# Patient Record
Sex: MALE | Race: WHITE | NOT HISPANIC OR LATINO
[De-identification: names, ages, dates, MRNs, and addresses within clinical notes are randomized per-mention and may not be internally consistent; named-entity substitution may affect disease eponyms.]

---

## 2018-01-01 ENCOUNTER — RESULT REVIEW (OUTPATIENT)
Age: 73
End: 2018-01-01

## 2018-01-18 ENCOUNTER — RESULT REVIEW (OUTPATIENT)
Age: 73
End: 2018-01-18

## 2021-12-13 ENCOUNTER — TRANSCRIPTION ENCOUNTER (OUTPATIENT)
Age: 76
End: 2021-12-13

## 2022-09-14 ENCOUNTER — APPOINTMENT (OUTPATIENT)
Dept: ORTHOPEDIC SURGERY | Facility: CLINIC | Age: 77
End: 2022-09-14

## 2022-09-14 VITALS — BODY MASS INDEX: 29.84 KG/M2 | WEIGHT: 240 LBS | HEIGHT: 75 IN

## 2022-09-14 DIAGNOSIS — M10.9 GOUT, UNSPECIFIED: ICD-10-CM

## 2022-09-14 PROBLEM — Z00.00 ENCOUNTER FOR PREVENTIVE HEALTH EXAMINATION: Status: ACTIVE | Noted: 2022-09-14

## 2022-09-14 PROCEDURE — 20605 DRAIN/INJ JOINT/BURSA W/O US: CPT

## 2022-09-14 PROCEDURE — 99204 OFFICE O/P NEW MOD 45 MIN: CPT | Mod: 25

## 2022-09-14 PROCEDURE — 73110 X-RAY EXAM OF WRIST: CPT | Mod: RT

## 2022-09-14 NOTE — IMAGING
[de-identified] : right wrist:\par swelling, no ecchymosis\par ttp about wrist\par stiffness due to swelling\par nvid [Right] : right wrist [There are no fractures, subluxations or dislocations. No significant abnormalities are seen] : There are no fractures, subluxations or dislocations. No significant abnormalities are seen

## 2022-09-14 NOTE — ASSESSMENT
[FreeTextEntry1] : The patient was advised of the diagnosis. The natural history of the pathology was explained in full to the patient in layman's terms. All questions were answered. The risks and benefits of surgical and non-surgical treatment alternatives were explained in full to the patient.\par \par Medium joint injection was performed of the left wrist. The indication for this procedure was pain and inflammation. The site was prepped with alcohol and ethyl chloride sprayed topically. An injection of Lidocaine 1cc of 1%  was used Betamethasone (Celestone) 1cc of 6mg.  Patient was advised to call if redness, pain or fever occur and apply ice for 15 minutes out of every hour for the next 12-24 hours as tolerated. The risks benefits, and alternatives have been discussed, and verbal consent was obtained\par

## 2022-09-14 NOTE — HISTORY OF PRESENT ILLNESS
[Intermittent] : intermittent [Nothing helps with pain getting better] : Nothing helps with pain getting better [de-identified] : 9/14/22:  30y h/o gout- stopped takign allopurinol, having flare ups intermittently. 4w ago hit self with hammer and went into  gout attack.  [] : no [FreeTextEntry1] : RT hand and wrist [FreeTextEntry5] : Pt hit his wrist with a hammer. Hx of Gout RT wrist is swollen.

## 2022-12-07 ENCOUNTER — NON-APPOINTMENT (OUTPATIENT)
Age: 77
End: 2022-12-07

## 2023-01-06 ENCOUNTER — APPOINTMENT (OUTPATIENT)
Dept: RADIOLOGY | Facility: CLINIC | Age: 78
End: 2023-01-06
Payer: MEDICARE

## 2023-01-06 PROCEDURE — 71046 X-RAY EXAM CHEST 2 VIEWS: CPT

## 2023-03-20 ENCOUNTER — APPOINTMENT (OUTPATIENT)
Dept: ORTHOPEDIC SURGERY | Facility: CLINIC | Age: 78
End: 2023-03-20
Payer: MEDICARE

## 2023-03-20 VITALS — BODY MASS INDEX: 28.6 KG/M2 | HEIGHT: 75 IN | WEIGHT: 230 LBS

## 2023-03-20 DIAGNOSIS — Z86.79 PERSONAL HISTORY OF OTHER DISEASES OF THE CIRCULATORY SYSTEM: ICD-10-CM

## 2023-03-20 PROCEDURE — 72170 X-RAY EXAM OF PELVIS: CPT

## 2023-03-20 PROCEDURE — 99213 OFFICE O/P EST LOW 20 MIN: CPT

## 2023-03-20 PROCEDURE — 72100 X-RAY EXAM L-S SPINE 2/3 VWS: CPT

## 2023-03-20 NOTE — IMAGING
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

## 2023-03-20 NOTE — PHYSICAL EXAM
[Flexion] : flexion [Extension] : extension [Bending to left] : bending to left [Bending to right] : bending to right [] : mildly antalgic [Facet arthropathy] : Facet arthropathy [Disc space narrowing] : Disc space narrowing [AP] : anteroposterior [Mild arthritis (Tonnis Grade 1)] : Mild arthritis (Tonnis Grade 1) [de-identified] : radicular complaints to the left knee
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
## 2023-05-08 ENCOUNTER — APPOINTMENT (OUTPATIENT)
Dept: ORTHOPEDIC SURGERY | Facility: CLINIC | Age: 78
End: 2023-05-08
Payer: MEDICARE

## 2023-05-08 VITALS — HEIGHT: 75 IN | BODY MASS INDEX: 28.6 KG/M2 | WEIGHT: 230 LBS

## 2023-05-08 DIAGNOSIS — M51.36 OTHER INTERVERTEBRAL DISC DEGENERATION, LUMBAR REGION: ICD-10-CM

## 2023-05-08 DIAGNOSIS — M43.16 SPONDYLOLISTHESIS, LUMBAR REGION: ICD-10-CM

## 2023-05-08 DIAGNOSIS — M54.16 RADICULOPATHY, LUMBAR REGION: ICD-10-CM

## 2023-05-08 PROCEDURE — 99213 OFFICE O/P EST LOW 20 MIN: CPT

## 2023-05-08 NOTE — HISTORY OF PRESENT ILLNESS
[5] : 5 [0] : 0 [Dull/Aching] : dull/aching [Localized] : localized [Retired] : Work status: retired [de-identified] : 3-20-23- 4 week history of lower back pain with radicular complaints left leg to the level of the knee. No injury came on initially as he was climbing up stairs. since then pain with up and down stairs and prolonged standing. \par He can not take nsaids due to pmh of afib and on blood thinner.\par also states he has had elevation of bp in the past with prednisone [] : Post Surgical Visit: no [FreeTextEntry1] : L Leg/hip [FreeTextEntry3] : 2/2023 [FreeTextEntry5] : 77 Y/O RHD M eval L Leg/Hip pt presents with atraumatic pain onset 1 month ago associated with overuse. pt states pain and numbness radiate down the L leg  [de-identified] : None [de-identified] : [place]SAV

## 2023-09-26 ENCOUNTER — APPOINTMENT (OUTPATIENT)
Dept: ORTHOPEDIC SURGERY | Facility: CLINIC | Age: 78
End: 2023-09-26

## 2023-10-18 ENCOUNTER — APPOINTMENT (OUTPATIENT)
Dept: RADIOLOGY | Facility: CLINIC | Age: 78
End: 2023-10-18
Payer: MEDICARE

## 2023-10-18 PROCEDURE — 71046 X-RAY EXAM CHEST 2 VIEWS: CPT

## 2024-02-12 ENCOUNTER — OUTPATIENT (OUTPATIENT)
Dept: OUTPATIENT SERVICES | Facility: HOSPITAL | Age: 79
LOS: 1 days | End: 2024-02-12
Payer: MEDICARE

## 2024-02-12 VITALS
HEART RATE: 68 BPM | HEIGHT: 74 IN | RESPIRATION RATE: 18 BRPM | WEIGHT: 214.95 LBS | TEMPERATURE: 98 F | OXYGEN SATURATION: 98 % | DIASTOLIC BLOOD PRESSURE: 68 MMHG | SYSTOLIC BLOOD PRESSURE: 155 MMHG
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
## 2024-02-12 DIAGNOSIS — Z98.890 OTHER SPECIFIED POSTPROCEDURAL STATES: Chronic | ICD-10-CM

## 2024-02-12 DIAGNOSIS — K40.90 UNILATERAL INGUINAL HERNIA, WITHOUT OBSTRUCTION OR GANGRENE, NOT SPECIFIED AS RECURRENT: ICD-10-CM

## 2024-02-12 DIAGNOSIS — K40.30 UNILATERAL INGUINAL HERNIA, WITH OBSTRUCTION, WITHOUT GANGRENE, NOT SPECIFIED AS RECURRENT: ICD-10-CM

## 2024-02-12 DIAGNOSIS — Z01.818 ENCOUNTER FOR OTHER PREPROCEDURAL EXAMINATION: ICD-10-CM

## 2024-02-12 PROCEDURE — G0463: CPT

## 2024-02-12 NOTE — H&P PST ADULT - NSICDXFAMILYHX_GEN_ALL_CORE_FT
FAMILY HISTORY:  Mother  Still living? No  FH: hypertension, Age at diagnosis: Age Unknown  FH: stroke, Age at diagnosis: Age Unknown

## 2024-02-12 NOTE — H&P PST ADULT - PROBLEM SELECTOR PLAN 1
scheduled for Left Inguinal Hernia repair  Pre-op instructions provided  Patient is on Eliquis for Afib. Advised to get Instructions from PCP

## 2024-02-12 NOTE — H&P PST ADULT - NSICDXPASTMEDICALHX_GEN_ALL_CORE_FT
PAST MEDICAL HISTORY:  Afib     Asthma     Constipation     Difficulty hearing     Enlarged prostate     Eye pressure     Hypercholesteremia     Hypertension     Inguinal hernia     Skin cancer     White coat syndrome with diagnosis of hypertension

## 2024-02-12 NOTE — H&P PST ADULT - HISTORY OF PRESENT ILLNESS
80 y/o male present with left Inguinal Hernia. Patient has history of Afib, on Eliquis  Patient reports that he has discomfort in his left groin site and it hurts with daily activities.  Patient is scheduled for Left Inguinal Hernia Repair on 02/22/2024

## 2024-02-21 ENCOUNTER — TRANSCRIPTION ENCOUNTER (OUTPATIENT)
Age: 79
End: 2024-02-21

## 2024-02-22 ENCOUNTER — TRANSCRIPTION ENCOUNTER (OUTPATIENT)
Age: 79
End: 2024-02-22

## 2024-02-22 ENCOUNTER — OUTPATIENT (OUTPATIENT)
Dept: OUTPATIENT SERVICES | Facility: HOSPITAL | Age: 79
LOS: 1 days | End: 2024-02-22
Payer: MEDICARE

## 2024-02-22 VITALS
TEMPERATURE: 98 F | WEIGHT: 210.54 LBS | DIASTOLIC BLOOD PRESSURE: 70 MMHG | HEART RATE: 84 BPM | HEIGHT: 75 IN | OXYGEN SATURATION: 99 % | RESPIRATION RATE: 15 BRPM | SYSTOLIC BLOOD PRESSURE: 180 MMHG

## 2024-02-22 VITALS
SYSTOLIC BLOOD PRESSURE: 174 MMHG | RESPIRATION RATE: 15 BRPM | OXYGEN SATURATION: 99 % | TEMPERATURE: 98 F | DIASTOLIC BLOOD PRESSURE: 92 MMHG | HEART RATE: 87 BPM
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
## 2024-02-22 DIAGNOSIS — Z98.890 OTHER SPECIFIED POSTPROCEDURAL STATES: Chronic | ICD-10-CM

## 2024-02-22 DIAGNOSIS — K40.30 UNILATERAL INGUINAL HERNIA, WITH OBSTRUCTION, WITHOUT GANGRENE, NOT SPECIFIED AS RECURRENT: ICD-10-CM

## 2024-02-22 PROCEDURE — 49507 PRP I/HERN INIT BLOCK >5 YR: CPT | Mod: LT

## 2024-02-22 PROCEDURE — 88302 TISSUE EXAM BY PATHOLOGIST: CPT

## 2024-02-22 PROCEDURE — 88302 TISSUE EXAM BY PATHOLOGIST: CPT | Mod: 26

## 2024-02-22 PROCEDURE — C1781: CPT

## 2024-02-22 DEVICE — MESH HERNIA PERFIX PLUG LARGE 1.6 X 1.90": Type: IMPLANTABLE DEVICE | Status: FUNCTIONAL

## 2024-02-22 RX ORDER — SODIUM CHLORIDE 9 MG/ML
1000 INJECTION, SOLUTION INTRAVENOUS
Refills: 0 | Status: DISCONTINUED | OUTPATIENT
Start: 2024-02-22 | End: 2024-02-22

## 2024-02-22 RX ORDER — APIXABAN 2.5 MG/1
1 TABLET, FILM COATED ORAL
Refills: 0 | DISCHARGE

## 2024-02-22 RX ORDER — TAMSULOSIN HYDROCHLORIDE 0.4 MG/1
1 CAPSULE ORAL
Refills: 0 | DISCHARGE

## 2024-02-22 RX ORDER — CEFAZOLIN SODIUM 1 G
2000 VIAL (EA) INJECTION ONCE
Refills: 0 | Status: COMPLETED | OUTPATIENT
Start: 2024-02-22 | End: 2024-02-22

## 2024-02-22 RX ORDER — HYDROMORPHONE HYDROCHLORIDE 2 MG/ML
0.2 INJECTION INTRAMUSCULAR; INTRAVENOUS; SUBCUTANEOUS
Refills: 0 | Status: DISCONTINUED | OUTPATIENT
Start: 2024-02-22 | End: 2024-02-22

## 2024-02-22 RX ORDER — PROPRANOLOL HCL 160 MG
1 CAPSULE, EXTENDED RELEASE 24HR ORAL
Refills: 0 | DISCHARGE

## 2024-02-22 RX ORDER — LATANOPROST 0.05 MG/ML
1 SOLUTION/ DROPS OPHTHALMIC; TOPICAL
Refills: 0 | DISCHARGE

## 2024-02-22 RX ORDER — ONDANSETRON 8 MG/1
4 TABLET, FILM COATED ORAL ONCE
Refills: 0 | Status: DISCONTINUED | OUTPATIENT
Start: 2024-02-22 | End: 2024-02-22

## 2024-02-22 RX ORDER — CHLORHEXIDINE GLUCONATE 213 G/1000ML
1 SOLUTION TOPICAL ONCE
Refills: 0 | Status: COMPLETED | OUTPATIENT
Start: 2024-02-22 | End: 2024-02-22

## 2024-02-22 RX ORDER — HYDROMORPHONE HYDROCHLORIDE 2 MG/ML
0.5 INJECTION INTRAMUSCULAR; INTRAVENOUS; SUBCUTANEOUS
Refills: 0 | Status: DISCONTINUED | OUTPATIENT
Start: 2024-02-22 | End: 2024-02-22

## 2024-02-22 RX ORDER — CAPTOPRIL 12.5 MG/1
1 TABLET ORAL
Refills: 0 | DISCHARGE

## 2024-02-22 RX ORDER — ATORVASTATIN CALCIUM 80 MG/1
1 TABLET, FILM COATED ORAL
Refills: 0 | DISCHARGE

## 2024-02-22 RX ORDER — OXYCODONE AND ACETAMINOPHEN 5; 325 MG/1; MG/1
1 TABLET ORAL ONCE
Refills: 0 | Status: DISCONTINUED | OUTPATIENT
Start: 2024-02-22 | End: 2024-02-22

## 2024-02-22 RX ORDER — BUDESONIDE, MICRONIZED 100 %
1 POWDER (GRAM) MISCELLANEOUS
Refills: 0 | DISCHARGE

## 2024-02-22 RX ORDER — OXYCODONE HYDROCHLORIDE 5 MG/1
1 TABLET ORAL
Qty: 15 | Refills: 0
Start: 2024-02-22

## 2024-02-22 RX ADMIN — SODIUM CHLORIDE 75 MILLILITER(S): 9 INJECTION, SOLUTION INTRAVENOUS at 11:34

## 2024-02-22 RX ADMIN — CHLORHEXIDINE GLUCONATE 1 APPLICATION(S): 213 SOLUTION TOPICAL at 08:37

## 2024-02-22 NOTE — ASU DISCHARGE PLAN (ADULT/PEDIATRIC) - ASU DC SPECIAL INSTRUCTIONSFT
Follow all verbal and written instructions. Take medications as prescribed. DO NOT drive, operate machinery, and/or make important decisions while on prescription pain medication. DO NOT hesitate to call Doctor's office with questions or concerns. Certain prescription pain medication can cause constipation; take a stool softener such as Colace 100mg 3 x a day, to avoid the constipating effects of prescription pain medication.    - Remove outer dressing tomorrow before showering, leave steri strips underneath in place  - Ice for swelling  - No lifting >20 lbs for 6 weeks  - Please call the office at [phone] to schedule a post-op appointment for 7-10 days  May start you   tomorrow Follow all verbal and written instructions. Take medications as prescribed. DO NOT drive, operate machinery, and/or make important decisions while on prescription pain medication. DO NOT hesitate to call Doctor's office with questions or concerns. Certain prescription pain medication can cause constipation; take a stool softener such as Colace 100mg 3 x a day, to avoid the constipating effects of prescription pain medication.    RESTART your ELIQUIS TOMORROW 2/23    - Remove outer dressing tomorrow before showering, leave steri strips underneath in place  - Ice for swelling  - No lifting >20 lbs for 6 weeks  - Please call the office at [phone] to schedule a post-op appointment for 7-10 days  May start you   tomorrow Follow all verbal and written instructions. Take medications as prescribed. DO NOT drive, operate machinery, and/or make important decisions while on prescription pain medication. DO NOT hesitate to call Doctor's office with questions or concerns. Certain prescription pain medication can cause constipation; take a stool softener such as Colace 100mg 3 x a day, to avoid the constipating effects of prescription pain medication.    RESTART your ELIQUIS TOMORROW 2/23    For SEVERE pain:  Oxycodone for severe pain as prescribed.   prescription from your pharmacy    For MODERATE pain:  Tylenol 325mg 2 tabs (650mg total) every 6 hours.  Ibuprofen 200mg 2 tabs (400mg total) every 6 hours  Alternate taking medication every 3 hours - Take Tylenol 650 mg first, then 3 hours later take Ibuprofen 400mg.  Then 3 hours after the Ibuprofen, take Tylenol, and so on.  The Tylenol dose is 6 hours apart from the next Tylenol dose..... The Ibuprofen is 6 hours apart from the next Ibuprofen dose, but the Tylenol and the Ibuprofen are taken three hours apart from each other.    - Leave steri strips in place. OK to shower with them  - Ice for swelling  - No lifting >20 lbs for 6 weeks  - Please call the office at [phone] to schedule a post-op appointment for 7-10 days    Start your ELIQUIS tomorrow 2/23 Follow all verbal and written instructions. Take medications as prescribed. DO NOT drive, operate machinery, and/or make important decisions while on prescription pain medication. DO NOT hesitate to call Doctor's office with questions or concerns. Certain prescription pain medication can cause constipation; take a stool softener such as Colace 100mg 3 x a day, to avoid the constipating effects of prescription pain medication.    RESTART your ELIQUIS TOMORROW 2/23    For SEVERE pain:  Oxycodone for severe pain as prescribed.   prescription from your pharmacy    For MODERATE pain:  Tylenol 325mg 2 tabs (650mg total) every 6 hours.     - Leave steri strips in place. OK to shower with them  - Ice for swelling  - No lifting >20 lbs for 6 weeks  - Please call the office at [phone] to schedule a post-op appointment for 7-10 days    Start your ELIQUIS tomorrow 2/23

## 2024-02-22 NOTE — BRIEF OPERATIVE NOTE - NSICDXBRIEFPROCEDURE_GEN_ALL_CORE_FT
PROCEDURES:  Open repair of inguinal hernia using mesh in adult 22-Feb-2024 10:57:00 LEFT side Shira Arnold

## 2024-02-22 NOTE — ASU DISCHARGE PLAN (ADULT/PEDIATRIC) - FOLLOW UP APPOINTMENTS
[facility] Surgical care or [facility], Urgent Care, Emergency Room, or call 911 [facility] Surgical care or [facility], Urgent Care, Emergency Room, or call [phone]

## 2024-02-22 NOTE — ASU DISCHARGE PLAN (ADULT/PEDIATRIC) - PATIENT EDUCATION MATERIALS PROVIED
[facility] Surgical Care or [facility], Urgent Care, Emergency Room, or call 911

## 2024-02-22 NOTE — ASU DISCHARGE PLAN (ADULT/PEDIATRIC) - CARE PROVIDER_API CALL
Estuardo Bain  Surgery  [address], [address]  [place] [address]  Phone: [phone]  Fax: [phone]  Follow Up Time:

## 2024-02-22 NOTE — ASU DISCHARGE PLAN (ADULT/PEDIATRIC) - NS MD DC FALL RISK RISK
For information on Fall & Injury Prevention, visit: [email or web address] OR  [email or web address] OR  https://www.cdc.gov/steadi/patient.html

## 2024-02-29 ENCOUNTER — INPATIENT (INPATIENT)
Facility: HOSPITAL | Age: 79
LOS: 9 days | Discharge: HOME | End: 2024-03-10
Attending: SURGERY | Admitting: SURGERY
Payer: MEDICARE

## 2024-02-29 VITALS
TEMPERATURE: 98 F | HEIGHT: 75 IN | OXYGEN SATURATION: 100 % | HEART RATE: 71 BPM | DIASTOLIC BLOOD PRESSURE: 60 MMHG | RESPIRATION RATE: 18 BRPM | SYSTOLIC BLOOD PRESSURE: 127 MMHG

## 2024-02-29 DIAGNOSIS — Z98.890 OTHER SPECIFIED POSTPROCEDURAL STATES: Chronic | ICD-10-CM
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
## 2024-02-29 PROCEDURE — 99285 EMERGENCY DEPT VISIT HI MDM: CPT

## 2024-02-29 NOTE — ED ADULT NURSE NOTE - CHIEF COMPLAINT QUOTE
pt to ED for generalized weakness x2 day, pt is 1 week post op of inguinal hernia repair. c/o of ecchymosis to surgical site and testicular swelling. takes eliquis daily. denies chest pain, sob, dizziness. pt presents jaundice, pt baseline. arrives with 20G IV in L had, received 150ml NS en route. phx: afib, HTN, asthma, skin cancer, gilbert's disease

## 2024-02-29 NOTE — ED ADULT NURSE NOTE - NSFALLRISKINTERV_ED_ALL_ED

## 2024-02-29 NOTE — ED ADULT NURSE NOTE - OBJECTIVE STATEMENT
Pt received to Rm 9, A&Ox4. Pt endorsing generalized weakness x 2 days. Pt s/p inguinal hernia repair x 1 week, endorsing swelling/ecchymosis to surgical site as well as testicular swelling. Pt hx of afib on eliquis, HTN, skin ca, gilbert's disease. Pt denies headache, chest pain, SOB, dizziness, N/V/D, chills. Pt noted to be jaundiced, sclera jaundiced- pt baseline. Redness, swelling, eccymosis noted to surgical site/testicular region. Skin otherwise clean, dry, intact. Pt arrives with 20G Iv to left hand placed by EMS prior to arrival. Respirations even and unlabored. Safety maintained, Bed locked in lowest position, call bell within reach. Attending provider to evaluate pt, pending further orders.

## 2024-02-29 NOTE — ED ADULT TRIAGE NOTE - CHIEF COMPLAINT QUOTE
pt to ED for generalized weakness x2 day, pt is 1 week post op of inguinal hernia repair. c/o of ecchymosis to surgical site and testicular swelling. takes eliquis daily. denies chest pain, sob, dizziness. pt presents jaundice, pt baseline. phx: afib, HTN, asthma, skin cancer, gilbert's disease pt to ED for generalized weakness x2 day, pt is 1 week post op of inguinal hernia repair. c/o of ecchymosis to surgical site and testicular swelling. takes eliquis daily. denies chest pain, sob, dizziness. pt presents jaundice, pt baseline. arrives with 20G IV in L had, received 150ml NS en route. phx: afib, HTN, asthma, skin cancer, gilbert's disease

## 2024-03-01 DIAGNOSIS — E87.1 HYPO-OSMOLALITY AND HYPONATREMIA: ICD-10-CM

## 2024-03-01 PROBLEM — I10 ESSENTIAL (PRIMARY) HYPERTENSION: Chronic | Status: ACTIVE | Noted: 2024-02-12

## 2024-03-01 PROBLEM — K59.00 CONSTIPATION, UNSPECIFIED: Chronic | Status: ACTIVE | Noted: 2024-02-12

## 2024-03-01 PROBLEM — K40.90 UNILATERAL INGUINAL HERNIA, WITHOUT OBSTRUCTION OR GANGRENE, NOT SPECIFIED AS RECURRENT: Chronic | Status: ACTIVE | Noted: 2024-02-12

## 2024-03-01 PROBLEM — J45.909 UNSPECIFIED ASTHMA, UNCOMPLICATED: Chronic | Status: ACTIVE | Noted: 2024-02-12

## 2024-03-01 PROBLEM — N40.0 BENIGN PROSTATIC HYPERPLASIA WITHOUT LOWER URINARY TRACT SYMPTOMS: Chronic | Status: ACTIVE | Noted: 2024-02-12

## 2024-03-01 PROBLEM — C44.90 UNSPECIFIED MALIGNANT NEOPLASM OF SKIN, UNSPECIFIED: Chronic | Status: ACTIVE | Noted: 2024-02-12

## 2024-03-01 PROBLEM — H57.9 UNSPECIFIED DISORDER OF EYE AND ADNEXA: Chronic | Status: ACTIVE | Noted: 2024-02-12

## 2024-03-01 PROBLEM — E78.00 PURE HYPERCHOLESTEROLEMIA, UNSPECIFIED: Chronic | Status: ACTIVE | Noted: 2024-02-12

## 2024-03-01 PROBLEM — H91.90 UNSPECIFIED HEARING LOSS, UNSPECIFIED EAR: Chronic | Status: ACTIVE | Noted: 2024-02-12

## 2024-03-01 PROBLEM — I48.91 UNSPECIFIED ATRIAL FIBRILLATION: Chronic | Status: ACTIVE | Noted: 2024-02-12

## 2024-03-01 LAB
ADD ON TEST-SPECIMEN IN LAB: SIGNIFICANT CHANGE UP
ALBUMIN SERPL ELPH-MCNC: 3.2 G/DL — LOW (ref 3.3–5)
ALBUMIN SERPL ELPH-MCNC: 3.3 G/DL — SIGNIFICANT CHANGE UP (ref 3.3–5)
ALP SERPL-CCNC: 93 U/L — SIGNIFICANT CHANGE UP (ref 40–120)
ALP SERPL-CCNC: 99 U/L — SIGNIFICANT CHANGE UP (ref 40–120)
ALT FLD-CCNC: 17 U/L — SIGNIFICANT CHANGE UP (ref 4–41)
ALT FLD-CCNC: 22 U/L — SIGNIFICANT CHANGE UP (ref 4–41)
ANION GAP SERPL CALC-SCNC: 10 MMOL/L — SIGNIFICANT CHANGE UP (ref 7–14)
ANION GAP SERPL CALC-SCNC: 10 MMOL/L — SIGNIFICANT CHANGE UP (ref 7–14)
ANION GAP SERPL CALC-SCNC: 11 MMOL/L — SIGNIFICANT CHANGE UP (ref 7–14)
ANION GAP SERPL CALC-SCNC: 12 MMOL/L — SIGNIFICANT CHANGE UP (ref 7–14)
ANION GAP SERPL CALC-SCNC: 8 MMOL/L — SIGNIFICANT CHANGE UP (ref 7–14)
ANISOCYTOSIS BLD QL: SLIGHT — SIGNIFICANT CHANGE UP
APPEARANCE UR: ABNORMAL
APTT BLD: 34.4 SEC — SIGNIFICANT CHANGE UP (ref 24.5–35.6)
AST SERPL-CCNC: 31 U/L — SIGNIFICANT CHANGE UP (ref 4–40)
AST SERPL-CCNC: 33 U/L — SIGNIFICANT CHANGE UP (ref 4–40)
BACTERIA # UR AUTO: NEGATIVE /HPF — SIGNIFICANT CHANGE UP
BASE EXCESS BLDV CALC-SCNC: 0.5 MMOL/L — SIGNIFICANT CHANGE UP (ref -2–3)
BASOPHILS # BLD AUTO: 0 K/UL — SIGNIFICANT CHANGE UP (ref 0–0.2)
BASOPHILS NFR BLD AUTO: 0 % — SIGNIFICANT CHANGE UP (ref 0–2)
BILIRUB DIRECT SERPL-MCNC: 1.2 MG/DL — HIGH (ref 0–0.3)
BILIRUB INDIRECT FLD-MCNC: 7.4 MG/DL — HIGH (ref 0–1)
BILIRUB SERPL-MCNC: 8.2 MG/DL — HIGH (ref 0.2–1.2)
BILIRUB SERPL-MCNC: 8.6 MG/DL — HIGH (ref 0.2–1.2)
BILIRUB UR-MCNC: ABNORMAL
BLD GP AB SCN SERPL QL: NEGATIVE — SIGNIFICANT CHANGE UP
BLOOD GAS VENOUS COMPREHENSIVE RESULT: SIGNIFICANT CHANGE UP
BUN SERPL-MCNC: 13 MG/DL — SIGNIFICANT CHANGE UP (ref 7–23)
BUN SERPL-MCNC: 14 MG/DL — SIGNIFICANT CHANGE UP (ref 7–23)
BUN SERPL-MCNC: 15 MG/DL — SIGNIFICANT CHANGE UP (ref 7–23)
BUN SERPL-MCNC: 15 MG/DL — SIGNIFICANT CHANGE UP (ref 7–23)
BUN SERPL-MCNC: 16 MG/DL — SIGNIFICANT CHANGE UP (ref 7–23)
BURR CELLS BLD QL SMEAR: PRESENT — SIGNIFICANT CHANGE UP
CALCIUM SERPL-MCNC: 7.6 MG/DL — LOW (ref 8.4–10.5)
CALCIUM SERPL-MCNC: 7.9 MG/DL — LOW (ref 8.4–10.5)
CALCIUM SERPL-MCNC: 7.9 MG/DL — LOW (ref 8.4–10.5)
CALCIUM SERPL-MCNC: 8 MG/DL — LOW (ref 8.4–10.5)
CALCIUM SERPL-MCNC: 8 MG/DL — LOW (ref 8.4–10.5)
CAST: 0 /LPF — SIGNIFICANT CHANGE UP (ref 0–4)
CHLORIDE BLDV-SCNC: 76 MMOL/L — LOW (ref 96–108)
CHLORIDE SERPL-SCNC: 73 MMOL/L — LOW (ref 98–107)
CHLORIDE SERPL-SCNC: 75 MMOL/L — LOW (ref 98–107)
CHLORIDE SERPL-SCNC: 75 MMOL/L — LOW (ref 98–107)
CHLORIDE SERPL-SCNC: 78 MMOL/L — LOW (ref 98–107)
CHLORIDE SERPL-SCNC: 80 MMOL/L — LOW (ref 98–107)
CO2 BLDV-SCNC: 27.4 MMOL/L — HIGH (ref 22–26)
CO2 SERPL-SCNC: 21 MMOL/L — LOW (ref 22–31)
CO2 SERPL-SCNC: 21 MMOL/L — LOW (ref 22–31)
CO2 SERPL-SCNC: 23 MMOL/L — SIGNIFICANT CHANGE UP (ref 22–31)
COLOR SPEC: ABNORMAL
CREAT ?TM UR-MCNC: 99 MG/DL — SIGNIFICANT CHANGE UP
CREAT SERPL-MCNC: 0.58 MG/DL — SIGNIFICANT CHANGE UP (ref 0.5–1.3)
CREAT SERPL-MCNC: 0.59 MG/DL — SIGNIFICANT CHANGE UP (ref 0.5–1.3)
CREAT SERPL-MCNC: 0.61 MG/DL — SIGNIFICANT CHANGE UP (ref 0.5–1.3)
CREAT SERPL-MCNC: 0.61 MG/DL — SIGNIFICANT CHANGE UP (ref 0.5–1.3)
CREAT SERPL-MCNC: 0.7 MG/DL — SIGNIFICANT CHANGE UP (ref 0.5–1.3)
DIFF PNL FLD: NEGATIVE — SIGNIFICANT CHANGE UP
EGFR: 94 ML/MIN/1.73M2 — SIGNIFICANT CHANGE UP
EGFR: 98 ML/MIN/1.73M2 — SIGNIFICANT CHANGE UP
EGFR: 98 ML/MIN/1.73M2 — SIGNIFICANT CHANGE UP
EGFR: 99 ML/MIN/1.73M2 — SIGNIFICANT CHANGE UP
EGFR: 99 ML/MIN/1.73M2 — SIGNIFICANT CHANGE UP
EOSINOPHIL # BLD AUTO: 0 K/UL — SIGNIFICANT CHANGE UP (ref 0–0.5)
EOSINOPHIL NFR BLD AUTO: 0 % — SIGNIFICANT CHANGE UP (ref 0–6)
GAS PNL BLDV: 105 MMOL/L — CRITICAL LOW (ref 136–145)
GLUCOSE BLDV-MCNC: 88 MG/DL — SIGNIFICANT CHANGE UP (ref 70–99)
GLUCOSE SERPL-MCNC: 130 MG/DL — HIGH (ref 70–99)
GLUCOSE SERPL-MCNC: 81 MG/DL — SIGNIFICANT CHANGE UP (ref 70–99)
GLUCOSE SERPL-MCNC: 85 MG/DL — SIGNIFICANT CHANGE UP (ref 70–99)
GLUCOSE SERPL-MCNC: 88 MG/DL — SIGNIFICANT CHANGE UP (ref 70–99)
GLUCOSE SERPL-MCNC: 94 MG/DL — SIGNIFICANT CHANGE UP (ref 70–99)
GLUCOSE UR QL: NEGATIVE MG/DL — SIGNIFICANT CHANGE UP
HCO3 BLDV-SCNC: 26 MMOL/L — SIGNIFICANT CHANGE UP (ref 22–29)
HCT VFR BLD CALC: 24 % — LOW (ref 39–50)
HCT VFR BLD CALC: 29 % — LOW (ref 39–50)
HCT VFR BLDA CALC: 25 % — LOW (ref 39–51)
HGB BLD CALC-MCNC: 8.4 G/DL — LOW (ref 12.6–17.4)
HGB BLD-MCNC: 8.9 G/DL — LOW (ref 13–17)
HGB BLD-MCNC: 9.7 G/DL — LOW (ref 13–17)
IANC: 10.74 K/UL — HIGH (ref 1.8–7.4)
INR BLD: 2.06 RATIO — HIGH (ref 0.85–1.18)
KETONES UR-MCNC: 15 MG/DL
LACTATE BLDV-MCNC: 1.2 MMOL/L — SIGNIFICANT CHANGE UP (ref 0.5–2)
LEUKOCYTE ESTERASE UR-ACNC: ABNORMAL
LYMPHOCYTES # BLD AUTO: 1.26 K/UL — SIGNIFICANT CHANGE UP (ref 1–3.3)
LYMPHOCYTES # BLD AUTO: 8.7 % — LOW (ref 13–44)
MAGNESIUM SERPL-MCNC: 1.8 MG/DL — SIGNIFICANT CHANGE UP (ref 1.6–2.6)
MAGNESIUM SERPL-MCNC: 1.9 MG/DL — SIGNIFICANT CHANGE UP (ref 1.6–2.6)
MAGNESIUM SERPL-MCNC: 2.2 MG/DL — SIGNIFICANT CHANGE UP (ref 1.6–2.6)
MANUAL SMEAR VERIFICATION: SIGNIFICANT CHANGE UP
MCHC RBC-ENTMCNC: 30.9 PG — SIGNIFICANT CHANGE UP (ref 27–34)
MCHC RBC-ENTMCNC: 33.1 PG — SIGNIFICANT CHANGE UP (ref 27–34)
MCHC RBC-ENTMCNC: 33.4 GM/DL — SIGNIFICANT CHANGE UP (ref 32–36)
MCHC RBC-ENTMCNC: 37.1 GM/DL — HIGH (ref 32–36)
MCV RBC AUTO: 83.3 FL — SIGNIFICANT CHANGE UP (ref 80–100)
MCV RBC AUTO: 98.6 FL — SIGNIFICANT CHANGE UP (ref 80–100)
MICROCYTES BLD QL: SLIGHT — SIGNIFICANT CHANGE UP
MONOCYTES # BLD AUTO: 1.13 K/UL — HIGH (ref 0–0.9)
MONOCYTES NFR BLD AUTO: 7.8 % — SIGNIFICANT CHANGE UP (ref 2–14)
NEUTROPHILS # BLD AUTO: 12.07 K/UL — HIGH (ref 1.8–7.4)
NEUTROPHILS NFR BLD AUTO: 83.5 % — HIGH (ref 43–77)
NITRITE UR-MCNC: NEGATIVE — SIGNIFICANT CHANGE UP
NRBC # BLD: 0 /100 WBCS — SIGNIFICANT CHANGE UP (ref 0–0)
NRBC # FLD: 0 K/UL — SIGNIFICANT CHANGE UP (ref 0–0)
OSMOLALITY SERPL: 233 MOSM/KG — LOW (ref 275–295)
OSMOLALITY UR: 596 MOSM/KG — SIGNIFICANT CHANGE UP (ref 50–1200)
PCO2 BLDV: 45 MMHG — SIGNIFICANT CHANGE UP (ref 42–55)
PH BLDV: 7.37 — SIGNIFICANT CHANGE UP (ref 7.32–7.43)
PH UR: 8 — SIGNIFICANT CHANGE UP (ref 5–8)
PHOSPHATE SERPL-MCNC: 1.8 MG/DL — LOW (ref 2.5–4.5)
PHOSPHATE SERPL-MCNC: 1.9 MG/DL — LOW (ref 2.5–4.5)
PHOSPHATE SERPL-MCNC: 2.6 MG/DL — SIGNIFICANT CHANGE UP (ref 2.5–4.5)
PLAT MORPH BLD: NORMAL — SIGNIFICANT CHANGE UP
PLATELET # BLD AUTO: 290 K/UL — SIGNIFICANT CHANGE UP (ref 150–400)
PLATELET # BLD AUTO: 300 K/UL — SIGNIFICANT CHANGE UP (ref 150–400)
PLATELET COUNT - ESTIMATE: NORMAL — SIGNIFICANT CHANGE UP
PO2 BLDV: < 20 MMHG — SIGNIFICANT CHANGE UP (ref 25–45)
POIKILOCYTOSIS BLD QL AUTO: SLIGHT — SIGNIFICANT CHANGE UP
POLYCHROMASIA BLD QL SMEAR: SLIGHT — SIGNIFICANT CHANGE UP
POTASSIUM BLDV-SCNC: 4.4 MMOL/L — SIGNIFICANT CHANGE UP (ref 3.5–5.1)
POTASSIUM SERPL-MCNC: 4.3 MMOL/L — SIGNIFICANT CHANGE UP (ref 3.5–5.3)
POTASSIUM SERPL-MCNC: 4.5 MMOL/L — SIGNIFICANT CHANGE UP (ref 3.5–5.3)
POTASSIUM SERPL-MCNC: 4.8 MMOL/L — SIGNIFICANT CHANGE UP (ref 3.5–5.3)
POTASSIUM SERPL-MCNC: 4.9 MMOL/L — SIGNIFICANT CHANGE UP (ref 3.5–5.3)
POTASSIUM SERPL-MCNC: 5.3 MMOL/L — SIGNIFICANT CHANGE UP (ref 3.5–5.3)
POTASSIUM SERPL-SCNC: 4.3 MMOL/L — SIGNIFICANT CHANGE UP (ref 3.5–5.3)
POTASSIUM SERPL-SCNC: 4.5 MMOL/L — SIGNIFICANT CHANGE UP (ref 3.5–5.3)
POTASSIUM SERPL-SCNC: 4.8 MMOL/L — SIGNIFICANT CHANGE UP (ref 3.5–5.3)
POTASSIUM SERPL-SCNC: 4.9 MMOL/L — SIGNIFICANT CHANGE UP (ref 3.5–5.3)
POTASSIUM SERPL-SCNC: 5.3 MMOL/L — SIGNIFICANT CHANGE UP (ref 3.5–5.3)
POTASSIUM UR-SCNC: 66.6 MMOL/L — SIGNIFICANT CHANGE UP
PROT ?TM UR-MCNC: 25 MG/DL — SIGNIFICANT CHANGE UP
PROT SERPL-MCNC: 5.7 G/DL — LOW (ref 6–8.3)
PROT SERPL-MCNC: 5.9 G/DL — LOW (ref 6–8.3)
PROT UR-MCNC: 30 MG/DL
PROT/CREAT UR-RTO: 0.2 RATIO — SIGNIFICANT CHANGE UP (ref 0–0.2)
PROTHROM AB SERPL-ACNC: 22.8 SEC — HIGH (ref 9.5–13)
RBC # BLD: 2.88 M/UL — LOW (ref 4.2–5.8)
RBC # BLD: 2.94 M/UL — LOW (ref 4.2–5.8)
RBC # FLD: 13.2 % — SIGNIFICANT CHANGE UP (ref 10.3–14.5)
RBC # FLD: 13.7 % — SIGNIFICANT CHANGE UP (ref 10.3–14.5)
RBC BLD AUTO: ABNORMAL
RBC CASTS # UR COMP ASSIST: 2 /HPF — SIGNIFICANT CHANGE UP (ref 0–4)
RH IG SCN BLD-IMP: POSITIVE — SIGNIFICANT CHANGE UP
SAO2 % BLDV: 34.5 % — LOW (ref 67–88)
SODIUM SERPL-SCNC: 106 MMOL/L — CRITICAL LOW (ref 135–145)
SODIUM SERPL-SCNC: 107 MMOL/L — CRITICAL LOW (ref 135–145)
SODIUM SERPL-SCNC: 108 MMOL/L — CRITICAL LOW (ref 135–145)
SODIUM SERPL-SCNC: 111 MMOL/L — CRITICAL LOW (ref 135–145)
SODIUM SERPL-SCNC: 111 MMOL/L — CRITICAL LOW (ref 135–145)
SODIUM UR-SCNC: 41 MMOL/L — SIGNIFICANT CHANGE UP
SP GR SPEC: 1.02 — SIGNIFICANT CHANGE UP (ref 1–1.03)
SQUAMOUS # UR AUTO: 0 /HPF — SIGNIFICANT CHANGE UP (ref 0–5)
UROBILINOGEN FLD QL: 1 MG/DL — SIGNIFICANT CHANGE UP (ref 0.2–1)
UUN UR-MCNC: 990 MG/DL — SIGNIFICANT CHANGE UP
WBC # BLD: 14.46 K/UL — HIGH (ref 3.8–10.5)
WBC # BLD: 14.88 K/UL — HIGH (ref 3.8–10.5)
WBC # FLD AUTO: 14.46 K/UL — HIGH (ref 3.8–10.5)
WBC # FLD AUTO: 14.88 K/UL — HIGH (ref 3.8–10.5)
WBC UR QL: 1 /HPF — SIGNIFICANT CHANGE UP (ref 0–5)

## 2024-03-01 PROCEDURE — 99222 1ST HOSP IP/OBS MODERATE 55: CPT | Mod: 24,GC

## 2024-03-01 PROCEDURE — 74174 CTA ABD&PLVS W/CONTRAST: CPT | Mod: 26,MC

## 2024-03-01 PROCEDURE — 70450 CT HEAD/BRAIN W/O DYE: CPT | Mod: 26,MC

## 2024-03-01 RX ORDER — CHLORHEXIDINE GLUCONATE 213 G/1000ML
1 SOLUTION TOPICAL DAILY
Refills: 0 | Status: DISCONTINUED | OUTPATIENT
Start: 2024-03-01 | End: 2024-03-10

## 2024-03-01 RX ORDER — ATORVASTATIN CALCIUM 80 MG/1
20 TABLET, FILM COATED ORAL AT BEDTIME
Refills: 0 | Status: DISCONTINUED | OUTPATIENT
Start: 2024-03-01 | End: 2024-03-10

## 2024-03-01 RX ORDER — MAGNESIUM SULFATE 500 MG/ML
2 VIAL (ML) INJECTION ONCE
Refills: 0 | Status: COMPLETED | OUTPATIENT
Start: 2024-03-01 | End: 2024-03-01

## 2024-03-01 RX ORDER — POTASSIUM PHOSPHATE, MONOBASIC POTASSIUM PHOSPHATE, DIBASIC 236; 224 MG/ML; MG/ML
15 INJECTION, SOLUTION INTRAVENOUS ONCE
Refills: 0 | Status: COMPLETED | OUTPATIENT
Start: 2024-03-01 | End: 2024-03-01

## 2024-03-01 RX ORDER — MOMETASONE FUROATE 220 UG/1
1 INHALANT RESPIRATORY (INHALATION) DAILY
Refills: 0 | Status: DISCONTINUED | OUTPATIENT
Start: 2024-03-01 | End: 2024-03-01

## 2024-03-01 RX ORDER — SODIUM CHLORIDE 5 G/100ML
500 INJECTION, SOLUTION INTRAVENOUS
Refills: 0 | Status: DISCONTINUED | OUTPATIENT
Start: 2024-03-01 | End: 2024-03-03

## 2024-03-01 RX ORDER — CAPTOPRIL 12.5 MG/1
25 TABLET ORAL EVERY 8 HOURS
Refills: 0 | Status: DISCONTINUED | OUTPATIENT
Start: 2024-03-01 | End: 2024-03-03

## 2024-03-01 RX ORDER — SODIUM CHLORIDE 9 MG/ML
1000 INJECTION INTRAMUSCULAR; INTRAVENOUS; SUBCUTANEOUS
Refills: 0 | Status: DISCONTINUED | OUTPATIENT
Start: 2024-03-01 | End: 2024-03-01

## 2024-03-01 RX ORDER — METOPROLOL TARTRATE 50 MG
5 TABLET ORAL EVERY 6 HOURS
Refills: 0 | Status: DISCONTINUED | OUTPATIENT
Start: 2024-03-01 | End: 2024-03-01

## 2024-03-01 RX ORDER — TAMSULOSIN HYDROCHLORIDE 0.4 MG/1
0.4 CAPSULE ORAL AT BEDTIME
Refills: 0 | Status: DISCONTINUED | OUTPATIENT
Start: 2024-03-01 | End: 2024-03-10

## 2024-03-01 RX ADMIN — Medication 5 MILLIGRAM(S): at 17:38

## 2024-03-01 RX ADMIN — SODIUM CHLORIDE 75 MILLILITER(S): 9 INJECTION INTRAMUSCULAR; INTRAVENOUS; SUBCUTANEOUS at 04:22

## 2024-03-01 RX ADMIN — Medication 5 MILLIGRAM(S): at 12:14

## 2024-03-01 RX ADMIN — Medication 25 GRAM(S): at 12:13

## 2024-03-01 RX ADMIN — ATORVASTATIN CALCIUM 20 MILLIGRAM(S): 80 TABLET, FILM COATED ORAL at 22:46

## 2024-03-01 RX ADMIN — CHLORHEXIDINE GLUCONATE 1 APPLICATION(S): 213 SOLUTION TOPICAL at 12:14

## 2024-03-01 RX ADMIN — SODIUM CHLORIDE 50 MILLILITER(S): 5 INJECTION, SOLUTION INTRAVENOUS at 12:14

## 2024-03-01 RX ADMIN — TAMSULOSIN HYDROCHLORIDE 0.4 MILLIGRAM(S): 0.4 CAPSULE ORAL at 22:45

## 2024-03-01 RX ADMIN — CAPTOPRIL 25 MILLIGRAM(S): 12.5 TABLET ORAL at 22:45

## 2024-03-01 RX ADMIN — CAPTOPRIL 25 MILLIGRAM(S): 12.5 TABLET ORAL at 13:40

## 2024-03-01 RX ADMIN — POTASSIUM PHOSPHATE, MONOBASIC POTASSIUM PHOSPHATE, DIBASIC 62.5 MILLIMOLE(S): 236; 224 INJECTION, SOLUTION INTRAVENOUS at 12:13

## 2024-03-01 RX ADMIN — SODIUM CHLORIDE 30 MILLILITER(S): 5 INJECTION, SOLUTION INTRAVENOUS at 22:46

## 2024-03-01 NOTE — H&P ADULT - ASSESSMENT
Assessment: 79 year old man with afib on eliquis and recent LIHR 7 days ago by Dr. Bain, now with falls x2 after resuming eliquis.     Plan:  - admit, Dr. Bain  - CTA A/P follow up final read  - hold eliquis  - npo o/n, if stable will advance diet in the am  - IVF  - recheck BMP, admission lab ?hyponatremia   - hold vte ppx    Kyle Rai MD, PGY3  A Team Surgery   [id number]
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
## 2024-03-01 NOTE — ED PROVIDER NOTE - PHYSICAL EXAMINATION
GEN: Patient awake and alert. No acute distress, non-toxic.   Head: Normocephalic, atraumatic.  Neck: Nontender, full ROM.   Eyes: PERRLA b/l. EOMI, slight scleral icterus, no conjunctival injection. Moist mucous membranes.  CARDIAC: Irregular irregular. Normal S1, S2. No murmur, rubs, or gallops. No peripheral edema noted.  PULM: Speaking in full sentences. CTA B/L no wheeze, rales or rhonchi. No signs of respiratory distress, no accessory muscle usage or nasal flaring.  ABD/Pelvis: Marked ecchymosis and edema encompassing b/l inguinal region, suprapubic region and scrotum. Inguinal incision site indurated and TTP. Soft, nontender, nondistended in 4 quadrants. No rebound, no involuntary guarding.   NEURO: A&Ox3 but moderately confused from baseline, no focal neurological deficits  SKIN: Ecchymosis as noted in ABD/PELV, mildly jaundiced.

## 2024-03-01 NOTE — CONSULT NOTE ADULT - ATTENDING COMMENTS
med induced coagulopathy, acute blood loss anemia and resorptive jaundice    severe hyponatremia, monitor for neurologic consequences during judicious repletion med induced coagulopathy, acute blood loss anemia and resorptive jaundice  -hold eliquis as per d/w surgeon  -gentle hydration    severe hyponatremia, monitor for neurologic consequences during judicious repletion  -hydration with hypertonic saline  -seriel sodiums    hypophosphatemia  -replete

## 2024-03-01 NOTE — ED ADULT NURSE REASSESSMENT NOTE - NS ED NURSE REASSESS COMMENT FT1
Break RN: Pt is A&Ox4, resting in stretcher with no complaints at this time. Respirations even and unlabored, chest rise equal b/l. Pt placed on cardiac monitor. Afib noted on EKG and monitor. VS as noted in flow sheets. Pt denies chest pain, SOB, abdominal pain, N/V/D, h/a, dizziness, numbness/tingling or any urinary symptoms at this time. Lab collected and sent. No acute distress noted. Pt sent to CT scan with transporter. Safety maintained throughout.

## 2024-03-01 NOTE — PATIENT PROFILE ADULT - FALL HARM RISK - HARM RISK INTERVENTIONS
Assistance with ambulation/Assistance OOB with selected safe patient handling equipment/Communicate Risk of Fall with Harm to all staff/Monitor gait and stability/Provide patient with walking aids - walker, cane, crutches/Reinforce activity limits and safety measures with patient and family/Review medications for side effects contributing to fall risk/Sit up slowly, dangle for a short time, stand at bedside before walking/Tailored Fall Risk Interventions/Toileting schedule using arm’s reach rule for commode and bathroom/Use of alarms - bed, chair and/or voice tab/Visual Cue: Yellow wristband and red socks/Bed in lowest position, wheels locked, appropriate side rails in place/Call bell, personal items and telephone in reach/Instruct patient to call for assistance before getting out of bed or chair/Non-slip footwear when patient is out of bed/[place] to call system/Physically safe environment - no spills, clutter or unnecessary equipment/Purposeful Proactive Rounding/Room/bathroom lighting operational, light cord in reach

## 2024-03-01 NOTE — PROVIDER CONTACT NOTE (OTHER) - BACKGROUND
s/p lt inguinal hernia repair 7 days ago; at home restarted eliquis, isaías x2, admitted for hyponatremia
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
## 2024-03-01 NOTE — CONSULT NOTE ADULT - ASSESSMENT
79M w/ PMH of Afib on Eliquis, s/p recent left inguinal hernia repair 7 days ago. 2 recent falls without headstrike, one on tuesday, one earlier today, was sent to ER by surgeon following seeing extensive bruising from waist to bilateral thighs.  Passing gas, denies groin pain, does have some swelling right groin, normal bowel function with soft brown BM today. No HS with fall, fell onto buttocks both times.  Pt reports mild lightheadedness and weakness of legs.  Nephrology consulted for hyponatremia.    A/P:  Hyponatremia:  Na critically low.  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  Clinically euvolemic on exam.  Pt reports he has been drinking more than 8glasses of water daily for years d/t hx of gout.  Recent decreased solid intake d/t hernia repair.  Fluid restriction to 1L/day - pt educated.  Currently on NS 1.5% @ 100mL/hr.  BMP repeated at 11am; pending results.  Decrease NS 1.5% to 50mL/hr x3hrs starting at 11am.  Goal of Na in next 24 hrs is between 114-116.  Please call Dr. Altamirano with results.  Discussed plan w/ ICU.  Avoid over correction of Na.    HTN:  Fluctuating.  Resume home meds.  Management per ICU.  Monitor BP.    Anemia:  Workup and management per ICU.  Transfuse for Hgb <8.  Monitor Hgb.    Hypocalcemia:  Consider checking Vit. D level.  Albumin level marginal - optimize.  Replete per ICU.  Monitor Ca.    Hypophosphatemia:  Likely sec to poor intake of solids.  Repleted by ICU w/ Kphos 15mmol.  Replete as needed.    Proteinuria:  Trace on UA.  UPr/Cr 0.2gm.  Monitor.  Monitor PO4.     79M w/ PMH of Afib on Eliquis, s/p recent left inguinal hernia repair 7 days ago. 2 recent falls without headstrike, one on tuesday, one earlier today, was sent to ER by surgeon following seeing extensive bruising from waist to bilateral thighs.  Passing gas, denies groin pain, does have some swelling right groin, normal bowel function with soft brown BM today. No HS with fall, fell onto buttocks both times.  Pt reports mild lightheadedness and weakness of legs.  Nephrology consulted for hyponatremia.    A/P:  Hyponatremia:  Na critically low.  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  Clinically euvolemic on exam.  Pt reports he has been drinking more than 8glasses of water daily for years d/t hx of gout.  Recent decreased solid intake d/t hernia repair.  Fluid restriction to 1L/day - pt educated.  Currently on NS 1.5% @ 100mL/hr.  BMP repeated at 11am; pending results.  Decrease NS 1.5% to 50mL/hr x3hrs starting at 11am.  Goal of Na in next 24 hrs is between 114-116.  Monitor Na q6hrs till its more than 120  Please call Dr. Altamirano with results.  Discussed plan w/ ICU.  Avoid over correction of Na.    HTN:  Fluctuating.  Resume home meds.  Management per ICU.  Monitor BP.    Anemia:  Workup and management per ICU.  Transfuse for Hgb <8.  Monitor Hgb.    Hypocalcemia:  Consider checking Vit. D level.  Albumin level is low   Replete per ICU.  Monitor Ca.    Hypophosphatemia:  Likely sec to poor intake of solids.  Repleted by ICU w/ Kphos 15mmol.  Replete as needed.    Proteinuria:  Trace on UA.  UPr/Cr 0.2gm.  Monitor.  Monitor PO4.

## 2024-03-01 NOTE — CHART NOTE - NSCHARTNOTEFT_GEN_A_CORE
Full consult to follow:    Hyponatremia:  work up suggest SIADH  has sever hyponatremia  Getting 1.5% 100cc/hr   Change to 50cc/hr after 3 hrs and repeat Na level  Goal of Na in next 24 hrs is between 114-116

## 2024-03-01 NOTE — PATIENT PROFILE ADULT - FUNCTIONAL ASSESSMENT - BASIC MOBILITY 5.
92 Ventricular Rate BPM  92 Atrial Rate BPM  124 P-R Interval ms  74 QRS Duration ms  358 Q-T Interval ms  442 QTC Calculation(Bazett) ms  74 P Axis degrees  64 R Axis degrees  65 T Axis degrees  Normal sinus rhythm  Biatrial enlargement  Septal infarct , age undetermined  Abnormal ECG  Confirmed by MOHAN JEAN MARK ([id number]) on 9/2/2020 4:18:44 PM  
1 = Total assistance
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
## 2024-03-01 NOTE — ED PROVIDER NOTE - CLINICAL SUMMARY MEDICAL DECISION MAKING FREE TEXT BOX
79-year-old male with history of A-fib on Eliquis, HTN, Guilbert' disease, status post left inguinal hernia repair 1 week ago presenting for generalized weakness c/b numerous falls from standing height and worsening left inguinal ecchymosis and edema.  Hemodynamically stable.  Afebrile without systemic signs of infection.  Exam notable for marked ecchymosis and edema of the left inguinal region and scrotum concerning for potential expanding hematoma versus active extravasation in the setting of Eliquis usage.  Generalized weakness differential includes symptomatic anemia versus electrolyte derangement.  Will get screening labs, coags, type and screen, CTA abdomen pelvis.  Dispo pending labs and imaging but likely admission to surgery.

## 2024-03-01 NOTE — ED ADULT NURSE REASSESSMENT NOTE - NS ED NURSE REASSESS COMMENT FT1
Pt resting in stretcher, pt offers no complaints at this time. VS as noted in flowsheet. Respirations even and unlabored. Safety maintained, bed locked in lowest position. Awaiting transport Pt resting in stretcher, pt offers no complaints at this time. VS as noted in flowsheet. Respirations even and unlabored. Safety maintained, bed locked in lowest position. Awaiting transport, okay per MD Powers
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
## 2024-03-01 NOTE — ED PROVIDER NOTE - ATTENDING CONTRIBUTION TO CARE
80 yo M hx AF on eliquis, HTN, Hernandez's esophagus, one week s/p L inguinal hernia repair, presenting for evaluation of L groin swelling/bruising. Of note, pt has also had unsteady gait since going home, and has slipped trying to get into bed a few times. He reports hitting his head on the mattress, but denies hitting his head on the ground, no LOC- witnessed by wife. He lives at home with wife and son who are concerned about his mobility and have difficulty picking him up when he falls.    VITALS: reviewed  GEN: NAD, A & O x 4  HEAD/EYES: NCAT, EOMI, anicteric sclerae,   ENT: mucus membranes moist, oropharynx WNL, trachea midline,  RESP: lungs CTA with equal breath sounds bilaterally, chest wall nontender and atraumatic  CV: heart with reg rhythm S1, S2, distal pulses intact and symmetric bilaterally  ABDOMEN: normoactive bowel sounds, soft, nondistended, nontender, no palpable masses  : no CVAT, ecchymosis and swelling to L testicle and b/l groin into upper thighs with induration to L side, incision cdi.   MSK: extremities atraumatic and nontender, no edema, no asymmetry.  SKIN: warm, dry, no rash, no bruising, no cyanosis. color appropriate for ethnicity  NEURO: alert, mentating appropriately, no facial asymmetry.   PSYCH: Affect appropriate      Pt alert and oriented but per son he has been more confused, difficult to redirect in conversation- plan for CTH, CT angio abd/pelv to r/o bleed. Plan for surgery c/s and tba for PT. Pt signed out at 12 am.

## 2024-03-02 LAB
ALBUMIN SERPL ELPH-MCNC: 3.1 G/DL — LOW (ref 3.3–5)
ALP SERPL-CCNC: 103 U/L — SIGNIFICANT CHANGE UP (ref 40–120)
ALT FLD-CCNC: 21 U/L — SIGNIFICANT CHANGE UP (ref 4–41)
ANION GAP SERPL CALC-SCNC: 11 MMOL/L — SIGNIFICANT CHANGE UP (ref 7–14)
ANION GAP SERPL CALC-SCNC: 12 MMOL/L — SIGNIFICANT CHANGE UP (ref 7–14)
ANION GAP SERPL CALC-SCNC: 7 MMOL/L — SIGNIFICANT CHANGE UP (ref 7–14)
ANION GAP SERPL CALC-SCNC: 9 MMOL/L — SIGNIFICANT CHANGE UP (ref 7–14)
APTT BLD: 22.9 SEC — LOW (ref 24.5–35.6)
APTT BLD: 30.5 SEC — SIGNIFICANT CHANGE UP (ref 24.5–35.6)
AST SERPL-CCNC: 32 U/L — SIGNIFICANT CHANGE UP (ref 4–40)
BILIRUB DIRECT SERPL-MCNC: 1.1 MG/DL — HIGH (ref 0–0.3)
BILIRUB INDIRECT FLD-MCNC: 6.8 MG/DL — HIGH (ref 0–1)
BILIRUB SERPL-MCNC: 7.9 MG/DL — HIGH (ref 0.2–1.2)
BLD GP AB SCN SERPL QL: NEGATIVE — SIGNIFICANT CHANGE UP
BUN SERPL-MCNC: 16 MG/DL — SIGNIFICANT CHANGE UP (ref 7–23)
BUN SERPL-MCNC: 16 MG/DL — SIGNIFICANT CHANGE UP (ref 7–23)
BUN SERPL-MCNC: 18 MG/DL — SIGNIFICANT CHANGE UP (ref 7–23)
BUN SERPL-MCNC: 19 MG/DL — SIGNIFICANT CHANGE UP (ref 7–23)
CALCIUM SERPL-MCNC: 7.5 MG/DL — LOW (ref 8.4–10.5)
CALCIUM SERPL-MCNC: 7.6 MG/DL — LOW (ref 8.4–10.5)
CALCIUM SERPL-MCNC: 7.9 MG/DL — LOW (ref 8.4–10.5)
CALCIUM SERPL-MCNC: 7.9 MG/DL — LOW (ref 8.4–10.5)
CHLORIDE SERPL-SCNC: 78 MMOL/L — LOW (ref 98–107)
CHLORIDE SERPL-SCNC: 81 MMOL/L — LOW (ref 98–107)
CHLORIDE SERPL-SCNC: 81 MMOL/L — LOW (ref 98–107)
CHLORIDE SERPL-SCNC: 82 MMOL/L — LOW (ref 98–107)
CO2 SERPL-SCNC: 20 MMOL/L — LOW (ref 22–31)
CO2 SERPL-SCNC: 22 MMOL/L — SIGNIFICANT CHANGE UP (ref 22–31)
CO2 SERPL-SCNC: 22 MMOL/L — SIGNIFICANT CHANGE UP (ref 22–31)
CO2 SERPL-SCNC: 23 MMOL/L — SIGNIFICANT CHANGE UP (ref 22–31)
CREAT SERPL-MCNC: 0.67 MG/DL — SIGNIFICANT CHANGE UP (ref 0.5–1.3)
CREAT SERPL-MCNC: 0.74 MG/DL — SIGNIFICANT CHANGE UP (ref 0.5–1.3)
CREAT SERPL-MCNC: 0.76 MG/DL — SIGNIFICANT CHANGE UP (ref 0.5–1.3)
CREAT SERPL-MCNC: 0.88 MG/DL — SIGNIFICANT CHANGE UP (ref 0.5–1.3)
EGFR: 87 ML/MIN/1.73M2 — SIGNIFICANT CHANGE UP
EGFR: 91 ML/MIN/1.73M2 — SIGNIFICANT CHANGE UP
EGFR: 92 ML/MIN/1.73M2 — SIGNIFICANT CHANGE UP
EGFR: 95 ML/MIN/1.73M2 — SIGNIFICANT CHANGE UP
GLUCOSE BLDC GLUCOMTR-MCNC: 104 MG/DL — HIGH (ref 70–99)
GLUCOSE SERPL-MCNC: 112 MG/DL — HIGH (ref 70–99)
GLUCOSE SERPL-MCNC: 123 MG/DL — HIGH (ref 70–99)
GLUCOSE SERPL-MCNC: 91 MG/DL — SIGNIFICANT CHANGE UP (ref 70–99)
GLUCOSE SERPL-MCNC: 95 MG/DL — SIGNIFICANT CHANGE UP (ref 70–99)
HCT VFR BLD CALC: 20.6 % — CRITICAL LOW (ref 39–50)
HCT VFR BLD CALC: 21.8 % — LOW (ref 39–50)
HCT VFR BLD CALC: 24 % — LOW (ref 39–50)
HGB BLD-MCNC: 7.6 G/DL — LOW (ref 13–17)
HGB BLD-MCNC: 8.1 G/DL — LOW (ref 13–17)
HGB BLD-MCNC: 8.5 G/DL — LOW (ref 13–17)
INR BLD: 1.4 RATIO — HIGH (ref 0.85–1.18)
INR BLD: 1.51 RATIO — HIGH (ref 0.85–1.18)
MAGNESIUM SERPL-MCNC: 1.3 MG/DL — LOW (ref 1.6–2.6)
MAGNESIUM SERPL-MCNC: 2 MG/DL — SIGNIFICANT CHANGE UP (ref 1.6–2.6)
MAGNESIUM SERPL-MCNC: 2 MG/DL — SIGNIFICANT CHANGE UP (ref 1.6–2.6)
MAGNESIUM SERPL-MCNC: 2.1 MG/DL — SIGNIFICANT CHANGE UP (ref 1.6–2.6)
MAGNESIUM SERPL-MCNC: 2.1 MG/DL — SIGNIFICANT CHANGE UP (ref 1.6–2.6)
MCHC RBC-ENTMCNC: 30.9 PG — SIGNIFICANT CHANGE UP (ref 27–34)
MCHC RBC-ENTMCNC: 31.3 PG — SIGNIFICANT CHANGE UP (ref 27–34)
MCHC RBC-ENTMCNC: 31.5 PG — SIGNIFICANT CHANGE UP (ref 27–34)
MCHC RBC-ENTMCNC: 35.4 GM/DL — SIGNIFICANT CHANGE UP (ref 32–36)
MCHC RBC-ENTMCNC: 36.8 GM/DL — HIGH (ref 32–36)
MCHC RBC-ENTMCNC: 36.9 GM/DL — HIGH (ref 32–36)
MCV RBC AUTO: 82.5 FL — SIGNIFICANT CHANGE UP (ref 80–100)
MCV RBC AUTO: 84.8 FL — SIGNIFICANT CHANGE UP (ref 80–100)
MCV RBC AUTO: 84.8 FL — SIGNIFICANT CHANGE UP (ref 80–100)
MRSA PCR RESULT.: SIGNIFICANT CHANGE UP
NRBC # BLD: 0 /100 WBCS — SIGNIFICANT CHANGE UP (ref 0–0)
NRBC # FLD: 0 K/UL — SIGNIFICANT CHANGE UP (ref 0–0)
PHOSPHATE SERPL-MCNC: 2.1 MG/DL — LOW (ref 2.5–4.5)
PHOSPHATE SERPL-MCNC: 2.2 MG/DL — LOW (ref 2.5–4.5)
PHOSPHATE SERPL-MCNC: 2.2 MG/DL — LOW (ref 2.5–4.5)
PHOSPHATE SERPL-MCNC: 2.5 MG/DL — SIGNIFICANT CHANGE UP (ref 2.5–4.5)
PLATELET # BLD AUTO: 226 K/UL — SIGNIFICANT CHANGE UP (ref 150–400)
PLATELET # BLD AUTO: 277 K/UL — SIGNIFICANT CHANGE UP (ref 150–400)
PLATELET # BLD AUTO: 286 K/UL — SIGNIFICANT CHANGE UP (ref 150–400)
POTASSIUM SERPL-MCNC: 4.3 MMOL/L — SIGNIFICANT CHANGE UP (ref 3.5–5.3)
POTASSIUM SERPL-MCNC: 4.4 MMOL/L — SIGNIFICANT CHANGE UP (ref 3.5–5.3)
POTASSIUM SERPL-MCNC: 4.5 MMOL/L — SIGNIFICANT CHANGE UP (ref 3.5–5.3)
POTASSIUM SERPL-MCNC: 4.5 MMOL/L — SIGNIFICANT CHANGE UP (ref 3.5–5.3)
POTASSIUM SERPL-SCNC: 4.3 MMOL/L — SIGNIFICANT CHANGE UP (ref 3.5–5.3)
POTASSIUM SERPL-SCNC: 4.4 MMOL/L — SIGNIFICANT CHANGE UP (ref 3.5–5.3)
POTASSIUM SERPL-SCNC: 4.5 MMOL/L — SIGNIFICANT CHANGE UP (ref 3.5–5.3)
POTASSIUM SERPL-SCNC: 4.5 MMOL/L — SIGNIFICANT CHANGE UP (ref 3.5–5.3)
PROT SERPL-MCNC: 5.4 G/DL — LOW (ref 6–8.3)
PROTHROM AB SERPL-ACNC: 15.7 SEC — HIGH (ref 9.5–13)
PROTHROM AB SERPL-ACNC: 16.9 SEC — HIGH (ref 9.5–13)
RBC # BLD: 2.43 M/UL — LOW (ref 4.2–5.8)
RBC # BLD: 2.57 M/UL — LOW (ref 4.2–5.8)
RBC # BLD: 2.75 M/UL — LOW (ref 4.2–5.8)
RBC # FLD: 14 % — SIGNIFICANT CHANGE UP (ref 10.3–14.5)
RBC # FLD: 14.1 % — SIGNIFICANT CHANGE UP (ref 10.3–14.5)
RBC # FLD: 14.1 % — SIGNIFICANT CHANGE UP (ref 10.3–14.5)
RH IG SCN BLD-IMP: POSITIVE — SIGNIFICANT CHANGE UP
S AUREUS DNA NOSE QL NAA+PROBE: SIGNIFICANT CHANGE UP
SODIUM SERPL-SCNC: 110 MMOL/L — CRITICAL LOW (ref 135–145)
SODIUM SERPL-SCNC: 111 MMOL/L — CRITICAL LOW (ref 135–145)
SODIUM SERPL-SCNC: 112 MMOL/L — CRITICAL LOW (ref 135–145)
SODIUM SERPL-SCNC: 115 MMOL/L — CRITICAL LOW (ref 135–145)
WBC # BLD: 11.25 K/UL — HIGH (ref 3.8–10.5)
WBC # BLD: 12.67 K/UL — HIGH (ref 3.8–10.5)
WBC # BLD: 13.94 K/UL — HIGH (ref 3.8–10.5)
WBC # FLD AUTO: 11.25 K/UL — HIGH (ref 3.8–10.5)
WBC # FLD AUTO: 12.67 K/UL — HIGH (ref 3.8–10.5)
WBC # FLD AUTO: 13.94 K/UL — HIGH (ref 3.8–10.5)

## 2024-03-02 PROCEDURE — 99232 SBSQ HOSP IP/OBS MODERATE 35: CPT | Mod: 24,GC

## 2024-03-02 RX ORDER — BUDESONIDE, MICRONIZED 100 %
0.5 POWDER (GRAM) MISCELLANEOUS EVERY 12 HOURS
Refills: 0 | Status: DISCONTINUED | OUTPATIENT
Start: 2024-03-02 | End: 2024-03-02

## 2024-03-02 RX ORDER — POLYETHYLENE GLYCOL 3350 17 G/17G
17 POWDER, FOR SOLUTION ORAL
Refills: 0 | Status: DISCONTINUED | OUTPATIENT
Start: 2024-03-02 | End: 2024-03-10

## 2024-03-02 RX ORDER — LATANOPROST 0.05 MG/ML
1 SOLUTION/ DROPS OPHTHALMIC; TOPICAL AT BEDTIME
Refills: 0 | Status: DISCONTINUED | OUTPATIENT
Start: 2024-03-02 | End: 2024-03-10

## 2024-03-02 RX ORDER — MAGNESIUM SULFATE 500 MG/ML
2 VIAL (ML) INJECTION ONCE
Refills: 0 | Status: DISCONTINUED | OUTPATIENT
Start: 2024-03-02 | End: 2024-03-02

## 2024-03-02 RX ORDER — BUDESONIDE, MICRONIZED 100 %
2 POWDER (GRAM) MISCELLANEOUS
Refills: 0 | Status: DISCONTINUED | OUTPATIENT
Start: 2024-03-02 | End: 2024-03-10

## 2024-03-02 RX ORDER — SENNA PLUS 8.6 MG/1
1 TABLET ORAL DAILY
Refills: 0 | Status: DISCONTINUED | OUTPATIENT
Start: 2024-03-02 | End: 2024-03-10

## 2024-03-02 RX ADMIN — POLYETHYLENE GLYCOL 3350 17 GRAM(S): 17 POWDER, FOR SOLUTION ORAL at 17:48

## 2024-03-02 RX ADMIN — SODIUM CHLORIDE 50 MILLILITER(S): 5 INJECTION, SOLUTION INTRAVENOUS at 07:18

## 2024-03-02 RX ADMIN — LATANOPROST 1 DROP(S): 0.05 SOLUTION/ DROPS OPHTHALMIC; TOPICAL at 23:36

## 2024-03-02 RX ADMIN — CHLORHEXIDINE GLUCONATE 1 APPLICATION(S): 213 SOLUTION TOPICAL at 15:30

## 2024-03-02 RX ADMIN — ATORVASTATIN CALCIUM 20 MILLIGRAM(S): 80 TABLET, FILM COATED ORAL at 23:35

## 2024-03-02 RX ADMIN — CAPTOPRIL 25 MILLIGRAM(S): 12.5 TABLET ORAL at 07:01

## 2024-03-02 RX ADMIN — SODIUM CHLORIDE 50 MILLILITER(S): 5 INJECTION, SOLUTION INTRAVENOUS at 03:00

## 2024-03-02 RX ADMIN — CAPTOPRIL 25 MILLIGRAM(S): 12.5 TABLET ORAL at 13:28

## 2024-03-02 NOTE — PROGRESS NOTE ADULT - SUBJECTIVE AND OBJECTIVE BOX
A Team Surgery Daily Progress Note    Subjective:   Patient seen at bedside this AM. Reports feeling well, reporting confusion about what brought him to the hospital, re-routable. Denies chest pain, SOB. Tolerating diet without N/V. Reporting no pelvic pain.     Objective:    Vital Signs  T(C): 36 (03-02 @ 08:00), Max: 36.2 (03-01 @ 20:00)  HR: 65 (03-02 @ 08:00) (61 - 81)  BP: 103/53 (03-02 @ 08:00) (103/53 - 145/95)  RR: 15 (03-02 @ 08:00) (14 - 25)  SpO2: 100% (03-02 @ 08:00) (97% - 100%)  03-01-24 @ 07:01  -  03-02-24 @ 07:00  --------------------------------------------------------  IN:  Total IN: 0 mL    OUT:    Voided (mL): 735 mL  Total OUT: 735 mL    Total NET: -735 mL          Physical Exam:  GEN: resting in bed comfortably in NAD  RESP: no increased WOB  ABD: soft, non-distended, significant pelvic hematoma from infraumbilical to b/l thighs, induration at the skin, NTP  EXTR: warm, well-perfused without gross deformities; spontaneous movement in b/l U/L extrem  NEURO: AAOx4    Labs:                        8.5    13.94 )-----------( 286      ( 02 Mar 2024 01:00 )             24.0   03-02    112<LL>  |  81<L>  |  16  ----------------------------<  95  4.5   |  22  |  0.67    Ca    7.5<L>      02 Mar 2024 06:45  Phos  2.1     03-02  Mg     2.00     03-02    TPro  5.4<L>  /  Alb  3.1<L>  /  TBili  7.9<H>  /  DBili  1.1<H>  /  AST  32  /  ALT  21  /  AlkPhos  103  03-02    CAPILLARY BLOOD GLUCOSE      POCT Blood Glucose.: 104 mg/dL (02 Mar 2024 01:07)      Medications:   MEDICATIONS  (STANDING):  atorvastatin 20 milliGRAM(s) Oral at bedtime  buDESOnide   90 MICROgram(s) Inhaler 2 Puff(s) Inhalation two times a day  captopril 25 milliGRAM(s) Oral every 8 hours  chlorhexidine 2% Cloths 1 Application(s) Topical daily  latanoprost 0.005% Ophthalmic Solution 1 Drop(s) Both EYES at bedtime  polyethylene glycol 3350 17 Gram(s) Oral two times a day  propranolol 80 milliGRAM(s) Oral daily  sodium chloride 1.5%. 500 milliLiter(s) (50 mL/Hr) IV Continuous <Continuous>  tamsulosin 0.4 milliGRAM(s) Oral at bedtime    MEDICATIONS  (PRN):      Imaging:       A Team Surgery Daily Progress Note    Subjective:   Patient seen at bedside this AM. Reports feeling well, reporting confusion about what brought him to the hospital, re-routable. Denies chest pain, SOB. Tolerating diet without N/V. Reporting no pelvic pain.     Objective:    Vital Signs  T(C): 36 (03-02 @ 08:00), Max: 36.2 (03-01 @ 20:00)  HR: 65 (03-02 @ 08:00) (61 - 81)  BP: 103/53 (03-02 @ 08:00) (103/53 - 145/95)  RR: 15 (03-02 @ 08:00) (14 - 25)  SpO2: 100% (03-02 @ 08:00) (97% - 100%)  03-01-24 @ 07:01  -  03-02-24 @ 07:00  --------------------------------------------------------  IN:  Total IN: 0 mL    OUT:    Voided (mL): 735 mL  Total OUT: 735 mL    Total NET: -735 mL          Physical Exam:   GEN: resting in bed comfortably in NAD  RESP: no increased WOB  ABD: soft, non-distended, significant pelvic hematoma from infraumbilical to b/l thighs, induration at the skin, NTP  EXTR: warm, well-perfused without gross deformities; spontaneous movement in b/l U/L extrem  NEURO: Appearing confused, AOx1    Labs:                        8.5    13.94 )-----------( 286      ( 02 Mar 2024 01:00 )             24.0   03-02    112<LL>  |  81<L>  |  16  ----------------------------<  95  4.5   |  22  |  0.67    Ca    7.5<L>      02 Mar 2024 06:45  Phos  2.1     03-02  Mg     2.00     03-02    TPro  5.4<L>  /  Alb  3.1<L>  /  TBili  7.9<H>  /  DBili  1.1<H>  /  AST  32  /  ALT  21  /  AlkPhos  103  03-02    CAPILLARY BLOOD GLUCOSE      POCT Blood Glucose.: 104 mg/dL (02 Mar 2024 01:07)      Medications:   MEDICATIONS  (STANDING):  atorvastatin 20 milliGRAM(s) Oral at bedtime  buDESOnide   90 MICROgram(s) Inhaler 2 Puff(s) Inhalation two times a day  captopril 25 milliGRAM(s) Oral every 8 hours  chlorhexidine 2% Cloths 1 Application(s) Topical daily  latanoprost 0.005% Ophthalmic Solution 1 Drop(s) Both EYES at bedtime  polyethylene glycol 3350 17 Gram(s) Oral two times a day  propranolol 80 milliGRAM(s) Oral daily  sodium chloride 1.5%. 500 milliLiter(s) (50 mL/Hr) IV Continuous <Continuous>  tamsulosin 0.4 milliGRAM(s) Oral at bedtime    MEDICATIONS  (PRN):      Imaging:       A Team Surgery Daily Progress Note    Subjective:   Patient seen at bedside this AM. Reports feeling well, reporting confusion about what brought him to the hospital, re-routable. Denies chest pain, SOB. Tolerating diet without N/V. Reporting no pelvic pain.     Objective:    Vital Signs  T(C): 36 (03-02 @ 08:00), Max: 36.2 (03-01 @ 20:00)  HR: 65 (03-02 @ 08:00) (61 - 81)  BP: 103/53 (03-02 @ 08:00) (103/53 - 145/95)  RR: 15 (03-02 @ 08:00) (14 - 25)  SpO2: 100% (03-02 @ 08:00) (97% - 100%)  03-01-24 @ 07:01  -  03-02-24 @ 07:00  --------------------------------------------------------  IN:  Total IN: 0 mL    OUT:    Voided (mL): 735 mL  Total OUT: 735 mL    Total NET: -735 mL          Physical Exam:   GEN: resting in bed comfortably in NAD  RESP: no increased WOB  ABD: soft, non-distended, significant pelvic echymosis from infraumbilical to b/l thighs, induration at the skin, NTP  EXTR: warm, well-perfused without gross deformities; spontaneous movement in b/l U/L extrem  NEURO: Appearing confused, AOx1    Labs:                        8.5    13.94 )-----------( 286      ( 02 Mar 2024 01:00 )             24.0   03-02    112<LL>  |  81<L>  |  16  ----------------------------<  95  4.5   |  22  |  0.67    Ca    7.5<L>      02 Mar 2024 06:45  Phos  2.1     03-02  Mg     2.00     03-02    TPro  5.4<L>  /  Alb  3.1<L>  /  TBili  7.9<H>  /  DBili  1.1<H>  /  AST  32  /  ALT  21  /  AlkPhos  103  03-02    CAPILLARY BLOOD GLUCOSE      POCT Blood Glucose.: 104 mg/dL (02 Mar 2024 01:07)      Medications:   MEDICATIONS  (STANDING):  atorvastatin 20 milliGRAM(s) Oral at bedtime  buDESOnide   90 MICROgram(s) Inhaler 2 Puff(s) Inhalation two times a day  captopril 25 milliGRAM(s) Oral every 8 hours  chlorhexidine 2% Cloths 1 Application(s) Topical daily  latanoprost 0.005% Ophthalmic Solution 1 Drop(s) Both EYES at bedtime  polyethylene glycol 3350 17 Gram(s) Oral two times a day  propranolol 80 milliGRAM(s) Oral daily  sodium chloride 1.5%. 500 milliLiter(s) (50 mL/Hr) IV Continuous <Continuous>  tamsulosin 0.4 milliGRAM(s) Oral at bedtime    MEDICATIONS  (PRN):      Imaging:

## 2024-03-02 NOTE — PHYSICAL THERAPY INITIAL EVALUATION ADULT - PERTINENT HX OF CURRENT PROBLEM, REHAB EVAL
79M afib on Eliquis recent left inguinal hernia repair 7 days ago. 2 recent falls without headstrike, one on tuesday, one earlier today, was sent to ER by surgeon following seeing extensive bruising from waist to bilateral thighs.  Passing gas, denies groin pain, does have some swelling right groin, normal bowel function with soft brown BM today. No HS with fall, fell onto buttocks both times.

## 2024-03-02 NOTE — PROGRESS NOTE ADULT - ASSESSMENT
79M w/ PMH of Afib on Eliquis, s/p recent left inguinal hernia repair 7 days ago. 2 recent falls without headstrike, one on tuesday, one earlier today, was sent to ER by surgeon following seeing extensive bruising from waist to bilateral thighs.  Passing gas, denies groin pain, does have some swelling right groin, normal bowel function with soft brown BM today. No HS with fall, fell onto buttocks both times.  Pt reports mild lightheadedness and weakness of legs.  Nephrology consulted for hyponatremia.    A/P:  Hyponatremia:  Na critically low.  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 He is euvolemic and asymptomatic.  Clinically euvolemic on exam.  Pt reports he has been drinking more than 8glasses of water daily for years.  Recent decreased solid intake d/t hernia repair.  His Urine /Plasma Electrolyte ratio was 1.000. Hence he may need help other than fluidtriction for correction.  - Fluid restriction to 1L/day - pt educated.  - Continue 1.5 % Saline  - Goal of Na in next 24 hrs is between 114-116.  - Monitor Na q6hrs till its more than 120.  - If not improving, will hold IVF and switch to PO urea.      HTN:  Fluctuating.  Resume home meds.  Management per ICU.  Monitor BP.    Anemia:  Workup and management per ICU.  Transfuse for Hgb <8.  Monitor Hgb.    Hypocalcemia:  Consider checking Vit. D level.  Albumin level is low   Replete per ICU.  Monitor Ca.    Hypophosphatemia:  Likely sec to poor intake of solids.  Repleted by ICU w/ Kphos 15mmol.  Replete as needed.    Proteinuria:  Trace on UA.  UPr/Cr 0.2gm.  Monitor.  Monitor PO4.

## 2024-03-02 NOTE — PROGRESS NOTE ADULT - ASSESSMENT
Assessment: 79 year old man with afib on eliquis and recent LIHR 2/22/24 by Dr. Bain, now with falls x2 after resuming eliquis. Hyponatremia to 106 on BMP at admission, patient admitted to SICU for management.     Plan:  - Holding eliquis  - Diet  - Bladder scan to ensure no retension  - Miralax  - Appreciate nephro recs  - Excellent care per SICU    A Team Surgery   [id number]   Assessment: 79 year old man with afib on eliquis and recent LIHR 2/22/24 by Dr. Bain, now with falls x2 after resuming eliquis. Hyponatremia to 106 on BMP at admission, patient admitted to SICU for management.     Plan:  - Holding eliquis  - Diet  - Bladder scan to ensure no retension  - Miralax  - Appreciate nephro recs  - Excellent care per SICU    A Team Surgery   [id number]   Assessment: 79 year old man with afib on eliquis and recent LIHR 2/22/24 by Dr. Bain, now with falls x2 after resuming eliquis. Hyponatremia to 106 on BMP at admission, patient admitted to SICU for management.     Plan:  - Holding eliquis  - Diet  - Bladder scan to ensure no retention  - Miralax  - Appreciate nephro recs  - Excellent care per SICU    A Team Surgery   [id number]

## 2024-03-02 NOTE — PROGRESS NOTE ADULT - SUBJECTIVE AND OBJECTIVE BOX
Isac Stearns MD  Nephrology        SHAYNE VIGIL  79y  Patient is a 79y old  Male who presents with a chief complaint of hyponatremia (01 Mar 2024 12:23)    HPI:    Sent to ER for Hematoma, found to have Hyponatremia. No obvious risk but was drinking a lot of water. Of note is recent surgery and falls.  He is asymptomatic and euvolemic and was started on 1.5% Saline. Na went from 106 to 112 today.    HEALTH ISSUES - PROBLEM Dx:        MEDICATIONS  (STANDING):  atorvastatin 20 milliGRAM(s) Oral at bedtime  buDESOnide   90 MICROgram(s) Inhaler 2 Puff(s) Inhalation two times a day  captopril 25 milliGRAM(s) Oral every 8 hours  chlorhexidine 2% Cloths 1 Application(s) Topical daily  latanoprost 0.005% Ophthalmic Solution 1 Drop(s) Both EYES at bedtime  polyethylene glycol 3350 17 Gram(s) Oral two times a day  propranolol 80 milliGRAM(s) Oral daily  sodium chloride 1.5%. 500 milliLiter(s) (50 mL/Hr) IV Continuous <Continuous>  tamsulosin 0.4 milliGRAM(s) Oral at bedtime    MEDICATIONS  (PRN):    Vital Signs Last 24 Hrs  T(C): 36 (02 Mar 2024 08:00), Max: 36.7 (01 Mar 2024 09:30)  T(F): 96.8 (02 Mar 2024 08:00), Max: 98.1 (01 Mar 2024 09:30)  HR: 65 (02 Mar 2024 08:00) (61 - 81)  BP: 103/53 (02 Mar 2024 08:00) (103/53 - 145/95)  BP(mean): 67 (02 Mar 2024 08:00) (67 - 112)  RR: 15 (02 Mar 2024 08:00) (14 - 25)  SpO2: 100% (02 Mar 2024 08:00) (97% - 100%)    Parameters below as of 02 Mar 2024 08:00  Patient On (Oxygen Delivery Method): room air      Daily     Daily Weight in k.1 (01 Mar 2024 09:30)    PHYSICAL EXAM:  Constitutional: He  appears comfortable and not distressed. Not diaphoretic. Clinically euvolemic.    Neck:  The thyroid is normal. Trachea is midline.     Breasts: Normal examination.    Respiratory: The lungs are clear to auscultation. No dullness and expansion is normal.    Cardiovascular: S1 and S2 are normal. No mumurs, rubs or gallops are present.    Gastrointestinal: The abdomen is soft. No tenderness is present. No masses are present. Bowel sounds are normal.    Genitourinary: The bladder is not distended. No CVA tenderness is present.    Extremities: No edema is noted. No deformities are present.    Neurological: Cognition is normal. Tone, power and sensation are normal.     Skin: No lesions are seen  or palpated.    Lymph Nodes: No lymphadenopathy is present.    Psychiatric: Mood is appropriate. No hallucinations or flight of ideas are noted.                              8.5    13.94 )-----------( 286      ( 02 Mar 2024 01:00 )             24.0     -    112<LL>  |  81<L>  |  16  ----------------------------<  95  4.5   |  22  |  0.67    Ca    7.5<L>      02 Mar 2024 06:45  Phos  2.1     -  Mg     2.00     -    TPro  5.4<L>  /  Alb  3.1<L>  /  TBili  7.9<H>  /  DBili  1.1<H>  /  AST  32  /  ALT  21  /  AlkPhos  103  -    Osmolality, Serum: 233 mosm/kg (24 @ 02:01)   Potassium, Random Urine: 66.6 mmol/L (24 @ 02:01)   Osmolality, Random Urine: 596 mosm/kg (24 @ 02:01)   Sodium, Random Urine: 41 mmol/L (24 @ 02:01)   Uric Acid: 2.7 mg/dL (24 @ 23:37)

## 2024-03-02 NOTE — PROGRESS NOTE ADULT - ASSESSMENT
79y Male w pmhx of Afib on Eliquis s/p recent LIHR POD7 and 2 recent falls w extensive bruising and associated groin and scrotal hematoma. Incidentally, labs found pt to be hyponatremic to 107 in the ED, 106 on repeat on the floor. Pt asymptomatic, A&Ox3.      PLAN:   Neurologic:   - A&Ox3 per baseline  - Monitor mental status    Respiratory:   - satting well on RA, no issue    Cardiovascular:   - hold eliquis i/s/o hematoma    Gastrointestinal/Nutrition:   -regular diet    Renal/Genitourinary:   - severe hypotonic hyponatremia  - currently on 1.5% NS @50  - trend q6bmp  - fluid restrict  - nephro consulted    Hematologic:   - holding AC  - trend INR qd for elevated INR on admission    Infectious Disease:   - no abx needed at this time, trend WBC    Lines/Tubes:  - PIV    Endocrine:   - trend glucose on chemistries    Disposition:   - SICU

## 2024-03-02 NOTE — PHYSICAL THERAPY INITIAL EVALUATION ADULT - ADDITIONAL COMMENTS
Pt reports he lives with his wife in a home, uses cane, has fallen in the past (off the bed).   Patients Current SpO2: 97%

## 2024-03-02 NOTE — PHYSICAL THERAPY INITIAL EVALUATION ADULT - REHAB POTENTIAL, PT EVAL
fair, will monitor progress closely
[Clear] : left tympanic membrane clear
[Clear Effusion] : clear effusion
[Rhonchi] : rhonchi
[Subcostal Retractions] : no subcostal retractions
[Suprasternal Retractions] : no suprasternal retractions
[NL] : warm, clear
[FreeTextEntry7] : no stridor
No

## 2024-03-02 NOTE — PROGRESS NOTE ADULT - SUBJECTIVE AND OBJECTIVE BOX
SICU Daily Progress Note  =====================================================  Interval/Overnight Events:   - PT/OT consult  - sodium improved to 111, goal  first 24 hrs       Allergies: tetracycline (Hives; Rash)      MEDICATIONS:   --------------------------------------------------------------------------------------  Neurologic Medications    Respiratory Medications    Cardiovascular Medications  captopril 25 milliGRAM(s) Oral every 8 hours  propranolol 80 milliGRAM(s) Oral daily    Gastrointestinal Medications  sodium chloride 1.5%. 500 milliLiter(s) IV Continuous <Continuous>    Genitourinary Medications  tamsulosin 0.4 milliGRAM(s) Oral at bedtime    Hematologic/Oncologic Medications    Antimicrobial/Immunologic Medications    Endocrine/Metabolic Medications  atorvastatin 20 milliGRAM(s) Oral at bedtime    Topical/Other Medications  chlorhexidine 2% Cloths 1 Application(s) Topical daily  Pulmicort Flexhaler 180 mcg/actuation 1 Puff(s) 1 Puff(s) Inhalation two times a day    --------------------------------------------------------------------------------------    VITAL SIGNS, INS/OUTS (last 24 hours):  --------------------------------------------------------------------------------------  T(C): 36.2 (03-01-24 @ 20:00), Max: 36.7 (03-01-24 @ 04:26)  HR: 66 (03-01-24 @ 23:00) (61 - 78)  BP: 124/69 (03-01-24 @ 23:00) (108/53 - 148/93)  BP(mean): 75 (03-01-24 @ 23:00) (70 - 112)  ABP: --  ABP(mean): --  RR: 17 (03-01-24 @ 23:00) (15 - 25)  SpO2: 99% (03-01-24 @ 23:00) (97% - 100%)  Wt(kg): --  CVP(mm Hg): --  CI: --  CAPILLARY BLOOD GLUCOSE       N/A      02-29 @ 07:01  -  03-01 @ 07:00  --------------------------------------------------------  IN:    sodium chloride 0.9%: 150 mL  Total IN: 150 mL    OUT:    IV PiggyBack: 0 mL    Voided (mL): 100 mL  Total OUT: 100 mL    Total NET: 50 mL      03-01 @ 07:01  -  03-02 @ 01:01  --------------------------------------------------------  IN:    IV PiggyBack: 300 mL    Oral Fluid: 150 mL    sodium chloride 1.5%: 680 mL  Total IN: 1130 mL    OUT:    Voided (mL): 475 mL  Total OUT: 475 mL    Total NET: 655 mL        --------------------------------------------------------------------------------------    EXAM  NEUROLOGY  Exam: Normal, NAD, alert, oriented x3, no focal deficits.    HEENT  Exam: Normocephalic, atraumatic, EOMI.     RESPIRATORY  Exam: Lungs clear to auscultation, Normal expansion/effort.   Mechanical Ventilation:     CARDIOVASCULAR  Exam: S1, S2.  Regular rate and rhythm.     GI/NUTRITION  Exam: Abdomen soft, Non-tender, Non-distended.     VASCULAR  Exam: Extremities warm, pink, well-perfused.    MUSCULOSKELETAL  Exam: All extremities moving spontaneously without limitations.    SKIN  Exam: Good skin turgor, no skin breakdown.       Tubes/Lines/Drains  ***  [x] Peripheral IV  [] Central Venous Line     	[] R	[] L	[] IJ	[] Fem	[] SC	  [] Arterial Line		[] R	[] L	[] Fem	[] Rad	[] Ax	  [] PICC		[] Midline		[] Mediport  [] Urinary Catheter		  [x] Necessity of urinary, arterial, and venous catheters discussed    LABS  --------------------------------------------------------------------------------------  LABS:                        9.7    14.88 )-----------( 300      ( 01 Mar 2024 13:00 )             29.0     03-01    111<LL>  |  80<L>  |  16  ----------------------------<  130<H>  5.3   |  21<L>  |  0.70    Ca    7.6<L>      01 Mar 2024 18:30  Phos  2.6     03-01  Mg     2.20     03-01    TPro  5.9<L>  /  Alb  3.3  /  TBili  8.6<H>  /  DBili  1.2<H>  /  AST  33  /  ALT  22  /  AlkPhos  99  03-01    Lactate:    PT/INR - ( 29 Feb 2024 23:37 )   PT: 22.8 sec;   INR: 2.06 ratio         PTT - ( 29 Feb 2024 23:37 )  PTT:34.4 sec          Urinalysis Basic - ( 01 Mar 2024 18:30 )    Color: x / Appearance: x / SG: x / pH: x  Gluc: 130 mg/dL / Ketone: x  / Bili: x / Urobili: x   Blood: x / Protein: x / Nitrite: x   Leuk Esterase: x / RBC: x / WBC x   Sq Epi: x / Non Sq Epi: x / Bacteria: x        IMAGING:    --------------------------------------------------------------------------------------    IMAGING STUDIES:
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
## 2024-03-03 LAB
ANION GAP SERPL CALC-SCNC: 10 MMOL/L — SIGNIFICANT CHANGE UP (ref 7–14)
ANION GAP SERPL CALC-SCNC: 6 MMOL/L — LOW (ref 7–14)
ANION GAP SERPL CALC-SCNC: 8 MMOL/L — SIGNIFICANT CHANGE UP (ref 7–14)
ANION GAP SERPL CALC-SCNC: 8 MMOL/L — SIGNIFICANT CHANGE UP (ref 7–14)
ANION GAP SERPL CALC-SCNC: 9 MMOL/L — SIGNIFICANT CHANGE UP (ref 7–14)
BUN SERPL-MCNC: 15 MG/DL — SIGNIFICANT CHANGE UP (ref 7–23)
BUN SERPL-MCNC: 16 MG/DL — SIGNIFICANT CHANGE UP (ref 7–23)
BUN SERPL-MCNC: 16 MG/DL — SIGNIFICANT CHANGE UP (ref 7–23)
BUN SERPL-MCNC: 17 MG/DL — SIGNIFICANT CHANGE UP (ref 7–23)
BUN SERPL-MCNC: 17 MG/DL — SIGNIFICANT CHANGE UP (ref 7–23)
CA-I BLD-SCNC: 1.07 MMOL/L — LOW (ref 1.15–1.29)
CALCIUM SERPL-MCNC: 7.7 MG/DL — LOW (ref 8.4–10.5)
CALCIUM SERPL-MCNC: 7.9 MG/DL — LOW (ref 8.4–10.5)
CALCIUM SERPL-MCNC: 8 MG/DL — LOW (ref 8.4–10.5)
CALCIUM SERPL-MCNC: 8.1 MG/DL — LOW (ref 8.4–10.5)
CALCIUM SERPL-MCNC: 8.2 MG/DL — LOW (ref 8.4–10.5)
CHLORIDE SERPL-SCNC: 83 MMOL/L — LOW (ref 98–107)
CHLORIDE SERPL-SCNC: 85 MMOL/L — LOW (ref 98–107)
CHLORIDE SERPL-SCNC: 85 MMOL/L — LOW (ref 98–107)
CHLORIDE SERPL-SCNC: 86 MMOL/L — LOW (ref 98–107)
CHLORIDE SERPL-SCNC: 87 MMOL/L — LOW (ref 98–107)
CO2 SERPL-SCNC: 20 MMOL/L — LOW (ref 22–31)
CO2 SERPL-SCNC: 23 MMOL/L — SIGNIFICANT CHANGE UP (ref 22–31)
CO2 SERPL-SCNC: 23 MMOL/L — SIGNIFICANT CHANGE UP (ref 22–31)
CO2 SERPL-SCNC: 24 MMOL/L — SIGNIFICANT CHANGE UP (ref 22–31)
CO2 SERPL-SCNC: 25 MMOL/L — SIGNIFICANT CHANGE UP (ref 22–31)
CREAT SERPL-MCNC: 0.67 MG/DL — SIGNIFICANT CHANGE UP (ref 0.5–1.3)
CREAT SERPL-MCNC: 0.7 MG/DL — SIGNIFICANT CHANGE UP (ref 0.5–1.3)
CREAT SERPL-MCNC: 0.71 MG/DL — SIGNIFICANT CHANGE UP (ref 0.5–1.3)
CREAT SERPL-MCNC: 0.74 MG/DL — SIGNIFICANT CHANGE UP (ref 0.5–1.3)
CREAT SERPL-MCNC: 0.76 MG/DL — SIGNIFICANT CHANGE UP (ref 0.5–1.3)
EGFR: 91 ML/MIN/1.73M2 — SIGNIFICANT CHANGE UP
EGFR: 92 ML/MIN/1.73M2 — SIGNIFICANT CHANGE UP
EGFR: 93 ML/MIN/1.73M2 — SIGNIFICANT CHANGE UP
EGFR: 94 ML/MIN/1.73M2 — SIGNIFICANT CHANGE UP
EGFR: 95 ML/MIN/1.73M2 — SIGNIFICANT CHANGE UP
GLUCOSE BLDC GLUCOMTR-MCNC: 110 MG/DL — HIGH (ref 70–99)
GLUCOSE SERPL-MCNC: 105 MG/DL — HIGH (ref 70–99)
GLUCOSE SERPL-MCNC: 113 MG/DL — HIGH (ref 70–99)
GLUCOSE SERPL-MCNC: 131 MG/DL — HIGH (ref 70–99)
GLUCOSE SERPL-MCNC: 94 MG/DL — SIGNIFICANT CHANGE UP (ref 70–99)
GLUCOSE SERPL-MCNC: 95 MG/DL — SIGNIFICANT CHANGE UP (ref 70–99)
HCT VFR BLD CALC: 25.3 % — LOW (ref 39–50)
HCT VFR BLD CALC: 28.9 % — LOW (ref 39–50)
HGB BLD-MCNC: 10.4 G/DL — LOW (ref 13–17)
HGB BLD-MCNC: 9.3 G/DL — LOW (ref 13–17)
MAGNESIUM SERPL-MCNC: 2 MG/DL — SIGNIFICANT CHANGE UP (ref 1.6–2.6)
MAGNESIUM SERPL-MCNC: 2.1 MG/DL — SIGNIFICANT CHANGE UP (ref 1.6–2.6)
MCHC RBC-ENTMCNC: 31.5 PG — SIGNIFICANT CHANGE UP (ref 27–34)
MCHC RBC-ENTMCNC: 31.8 PG — SIGNIFICANT CHANGE UP (ref 27–34)
MCHC RBC-ENTMCNC: 36 GM/DL — SIGNIFICANT CHANGE UP (ref 32–36)
MCHC RBC-ENTMCNC: 36.8 GM/DL — HIGH (ref 32–36)
MCV RBC AUTO: 86.6 FL — SIGNIFICANT CHANGE UP (ref 80–100)
MCV RBC AUTO: 87.6 FL — SIGNIFICANT CHANGE UP (ref 80–100)
NRBC # BLD: 0 /100 WBCS — SIGNIFICANT CHANGE UP (ref 0–0)
NRBC # BLD: 0 /100 WBCS — SIGNIFICANT CHANGE UP (ref 0–0)
NRBC # FLD: 0 K/UL — SIGNIFICANT CHANGE UP (ref 0–0)
NRBC # FLD: 0 K/UL — SIGNIFICANT CHANGE UP (ref 0–0)
PHOSPHATE SERPL-MCNC: 2.4 MG/DL — LOW (ref 2.5–4.5)
PHOSPHATE SERPL-MCNC: 2.5 MG/DL — SIGNIFICANT CHANGE UP (ref 2.5–4.5)
PHOSPHATE SERPL-MCNC: 2.8 MG/DL — SIGNIFICANT CHANGE UP (ref 2.5–4.5)
PHOSPHATE SERPL-MCNC: 2.8 MG/DL — SIGNIFICANT CHANGE UP (ref 2.5–4.5)
PHOSPHATE SERPL-MCNC: 2.9 MG/DL — SIGNIFICANT CHANGE UP (ref 2.5–4.5)
PLATELET # BLD AUTO: 267 K/UL — SIGNIFICANT CHANGE UP (ref 150–400)
PLATELET # BLD AUTO: 313 K/UL — SIGNIFICANT CHANGE UP (ref 150–400)
POTASSIUM SERPL-MCNC: 4.6 MMOL/L — SIGNIFICANT CHANGE UP (ref 3.5–5.3)
POTASSIUM SERPL-MCNC: 4.7 MMOL/L — SIGNIFICANT CHANGE UP (ref 3.5–5.3)
POTASSIUM SERPL-MCNC: 4.8 MMOL/L — SIGNIFICANT CHANGE UP (ref 3.5–5.3)
POTASSIUM SERPL-MCNC: 5.6 MMOL/L — HIGH (ref 3.5–5.3)
POTASSIUM SERPL-MCNC: 6.4 MMOL/L — CRITICAL HIGH (ref 3.5–5.3)
POTASSIUM SERPL-SCNC: 4.6 MMOL/L — SIGNIFICANT CHANGE UP (ref 3.5–5.3)
POTASSIUM SERPL-SCNC: 4.7 MMOL/L — SIGNIFICANT CHANGE UP (ref 3.5–5.3)
POTASSIUM SERPL-SCNC: 4.8 MMOL/L — SIGNIFICANT CHANGE UP (ref 3.5–5.3)
POTASSIUM SERPL-SCNC: 5.6 MMOL/L — HIGH (ref 3.5–5.3)
POTASSIUM SERPL-SCNC: 6.4 MMOL/L — CRITICAL HIGH (ref 3.5–5.3)
RBC # BLD: 2.92 M/UL — LOW (ref 4.2–5.8)
RBC # BLD: 3.3 M/UL — LOW (ref 4.2–5.8)
RBC # FLD: 14.4 % — SIGNIFICANT CHANGE UP (ref 10.3–14.5)
RBC # FLD: 14.6 % — HIGH (ref 10.3–14.5)
SODIUM SERPL-SCNC: 113 MMOL/L — CRITICAL LOW (ref 135–145)
SODIUM SERPL-SCNC: 115 MMOL/L — CRITICAL LOW (ref 135–145)
SODIUM SERPL-SCNC: 117 MMOL/L — CRITICAL LOW (ref 135–145)
SODIUM SERPL-SCNC: 117 MMOL/L — CRITICAL LOW (ref 135–145)
SODIUM SERPL-SCNC: 120 MMOL/L — CRITICAL LOW (ref 135–145)
WBC # BLD: 12.92 K/UL — HIGH (ref 3.8–10.5)
WBC # BLD: 14.28 K/UL — HIGH (ref 3.8–10.5)
WBC # FLD AUTO: 12.92 K/UL — HIGH (ref 3.8–10.5)
WBC # FLD AUTO: 14.28 K/UL — HIGH (ref 3.8–10.5)

## 2024-03-03 PROCEDURE — 93010 ELECTROCARDIOGRAM REPORT: CPT

## 2024-03-03 PROCEDURE — 99232 SBSQ HOSP IP/OBS MODERATE 35: CPT | Mod: 24,GC

## 2024-03-03 RX ORDER — CAPTOPRIL 12.5 MG/1
25 TABLET ORAL EVERY 8 HOURS
Refills: 0 | Status: DISCONTINUED | OUTPATIENT
Start: 2024-03-03 | End: 2024-03-03

## 2024-03-03 RX ORDER — CAPTOPRIL 12.5 MG/1
25 TABLET ORAL
Refills: 0 | Status: DISCONTINUED | OUTPATIENT
Start: 2024-03-03 | End: 2024-03-10

## 2024-03-03 RX ORDER — INSULIN HUMAN 100 [IU]/ML
5 INJECTION, SOLUTION SUBCUTANEOUS ONCE
Refills: 0 | Status: COMPLETED | OUTPATIENT
Start: 2024-03-03 | End: 2024-03-03

## 2024-03-03 RX ORDER — CALCIUM GLUCONATE 100 MG/ML
2 VIAL (ML) INTRAVENOUS ONCE
Refills: 0 | Status: COMPLETED | OUTPATIENT
Start: 2024-03-03 | End: 2024-03-03

## 2024-03-03 RX ORDER — SODIUM CHLORIDE 9 MG/ML
2 INJECTION INTRAMUSCULAR; INTRAVENOUS; SUBCUTANEOUS EVERY 12 HOURS
Refills: 0 | Status: DISCONTINUED | OUTPATIENT
Start: 2024-03-03 | End: 2024-03-05

## 2024-03-03 RX ORDER — SODIUM,POTASSIUM PHOSPHATES 278-250MG
1 POWDER IN PACKET (EA) ORAL ONCE
Refills: 0 | Status: COMPLETED | OUTPATIENT
Start: 2024-03-03 | End: 2024-03-03

## 2024-03-03 RX ORDER — DEXTROSE 50 % IN WATER 50 %
50 SYRINGE (ML) INTRAVENOUS ONCE
Refills: 0 | Status: COMPLETED | OUTPATIENT
Start: 2024-03-03 | End: 2024-03-03

## 2024-03-03 RX ORDER — DEXTROSE 50 % IN WATER 50 %
50 SYRINGE (ML) INTRAVENOUS ONCE
Refills: 0 | Status: DISCONTINUED | OUTPATIENT
Start: 2024-03-03 | End: 2024-03-03

## 2024-03-03 RX ADMIN — SENNA PLUS 1 TABLET(S): 8.6 TABLET ORAL at 12:41

## 2024-03-03 RX ADMIN — TAMSULOSIN HYDROCHLORIDE 0.4 MILLIGRAM(S): 0.4 CAPSULE ORAL at 21:24

## 2024-03-03 RX ADMIN — Medication 2 PUFF(S): at 21:52

## 2024-03-03 RX ADMIN — LATANOPROST 1 DROP(S): 0.05 SOLUTION/ DROPS OPHTHALMIC; TOPICAL at 21:25

## 2024-03-03 RX ADMIN — Medication 1 TABLET(S): at 05:31

## 2024-03-03 RX ADMIN — INSULIN HUMAN 5 UNIT(S): 100 INJECTION, SOLUTION SUBCUTANEOUS at 23:40

## 2024-03-03 RX ADMIN — Medication 50 MILLILITER(S): at 23:39

## 2024-03-03 RX ADMIN — Medication 200 GRAM(S): at 23:40

## 2024-03-03 RX ADMIN — SODIUM CHLORIDE 50 MILLILITER(S): 5 INJECTION, SOLUTION INTRAVENOUS at 05:32

## 2024-03-03 RX ADMIN — Medication 200 GRAM(S): at 05:32

## 2024-03-03 RX ADMIN — SODIUM CHLORIDE 2 GRAM(S): 9 INJECTION INTRAMUSCULAR; INTRAVENOUS; SUBCUTANEOUS at 17:03

## 2024-03-03 RX ADMIN — Medication 2 PUFF(S): at 11:11

## 2024-03-03 RX ADMIN — SODIUM CHLORIDE 30 MILLILITER(S): 5 INJECTION, SOLUTION INTRAVENOUS at 12:42

## 2024-03-03 RX ADMIN — ATORVASTATIN CALCIUM 20 MILLIGRAM(S): 80 TABLET, FILM COATED ORAL at 21:25

## 2024-03-03 RX ADMIN — POLYETHYLENE GLYCOL 3350 17 GRAM(S): 17 POWDER, FOR SOLUTION ORAL at 05:32

## 2024-03-03 RX ADMIN — CHLORHEXIDINE GLUCONATE 1 APPLICATION(S): 213 SOLUTION TOPICAL at 12:41

## 2024-03-03 RX ADMIN — POLYETHYLENE GLYCOL 3350 17 GRAM(S): 17 POWDER, FOR SOLUTION ORAL at 17:02

## 2024-03-03 RX ADMIN — CAPTOPRIL 25 MILLIGRAM(S): 12.5 TABLET ORAL at 05:31

## 2024-03-03 NOTE — PROGRESS NOTE ADULT - SUBJECTIVE AND OBJECTIVE BOX
Cardiovascular Disease Progress Note    Overnight events: No acute events overnight.  no new cardiac sx  Otherwise review of systems negative    Objective Findings:  T(C): 36.3 (03-03-24 @ 00:00), Max: 36.3 (03-03-24 @ 00:00)  HR: 79 (03-03-24 @ 05:00) (52 - 79)  BP: 142/78 (03-03-24 @ 05:00) (88/55 - 143/60)  RR: 17 (03-03-24 @ 05:00) (14 - 25)  SpO2: 100% (03-03-24 @ 05:00) (92% - 100%)  Wt(kg): --  Daily     Daily       Physical Exam:  Gen: NAD  HEENT: EOMI  CV: RRR, normal S1 + S2, no m/r/g  Lungs: CTAB  Abd: soft, non-tender  Ext: No edema    Telemetry:    Laboratory Data:                        9.3    12.92 )-----------( 267      ( 03 Mar 2024 02:00 )             25.3     03-03    113<LL>  |  83<L>  |  17  ----------------------------<  105<H>  4.8   |  24  |  0.71    Ca    7.7<L>      03 Mar 2024 02:00  Phos  2.4     03-03  Mg     2.10     03-03    TPro  5.4<L>  /  Alb  3.1<L>  /  TBili  7.9<H>  /  DBili  1.1<H>  /  AST  32  /  ALT  21  /  AlkPhos  103  03-02    PT/INR - ( 02 Mar 2024 20:15 )   PT: 15.7 sec;   INR: 1.40 ratio         PTT - ( 02 Mar 2024 20:15 )  PTT:30.5 sec          Inpatient Medications:  MEDICATIONS  (STANDING):  atorvastatin 20 milliGRAM(s) Oral at bedtime  buDESOnide   90 MICROgram(s) Inhaler 2 Puff(s) Inhalation two times a day  captopril 25 milliGRAM(s) Oral every 8 hours  chlorhexidine 2% Cloths 1 Application(s) Topical daily  latanoprost 0.005% Ophthalmic Solution 1 Drop(s) Both EYES at bedtime  polyethylene glycol 3350 17 Gram(s) Oral two times a day  propranolol 80 milliGRAM(s) Oral daily  senna 1 Tablet(s) Oral daily  sodium chloride 1.5%. 500 milliLiter(s) (50 mL/Hr) IV Continuous <Continuous>  tamsulosin 0.4 milliGRAM(s) Oral at bedtime      Assessment:  79y Male w pmhx of Afib on Eliquis s/p recent LIHR  and 2 recent falls w extensive bruising and associated groin and scrotal hematoma. Incidentally, labs found pt to be hyponatremic    Recs:  cardiac stable  care per sicu  vol status stable. fluid restriction per renal  AC on hold for persistent afib, resume when safe. to consider watchman evaluation as outpatient to allow for safe discontinuation of AC  alan episodes with probable mechanical fall, cont to monitor on tele, consider decreasing propranolol to 60mg  hyponatremia, tx per renal, recs appreciated. slowly improving  transfuse prn to hg > 7  dvt ppx as able          Over 25 minutes spent on total encounter; more than 50% of the visit was spent counseling and/or coordinating care by the attending physician.      Steve Gu MD   Cardiovascular Disease  [phone]

## 2024-03-03 NOTE — PROGRESS NOTE ADULT - ASSESSMENT
79y Male w pmhx of Afib on Eliquis s/p recent LIHR POD7 and 2 recent falls w extensive bruising and associated groin and scrotal hematoma. Incidentally, labs found pt to be hyponatremic to 107 in the ED, 106 on repeat on the floor. Pt asymptomatic, A&Ox3.    PLAN:   Neurologic:   - A&Ox3 per baseline  - Monitor mental status    Respiratory:   - satting well on RA,     Cardiovascular:   - hold eliquis i/s/o hematoma  - continue captopril, atorvastatin, propranolol     Gastrointestinal/Nutrition:   -regular diet    Renal/Genitourinary:   - severe hypotonic hyponatremia  - currently on 1.5% NS @50, fluid restriction  - trend q6bmp.   - nephro following   - continue flomax   - Nava     Hematologic:   - holding AC  - trend INR qd for elevated INR on admission  - DVT ppx: SCDs only     Infectious Disease:   - no abx needed at this time, trend WBC    Lines/Tubes:  - PIV  - Nava      Endocrine:   - trend glucose on BMP    Disposition:   - SICU

## 2024-03-03 NOTE — PROGRESS NOTE ADULT - SUBJECTIVE AND OBJECTIVE BOX
Isac Stearns MD  Nephrology        SHAYNE VIGIL  79y  Patient is a 79y old  Male who presents with a chief complaint of hyponatremia (01 Mar 2024 12:23)    HPI:    Followed for Hyponatremia.  On 1.5% Saline.    HEALTH ISSUES - PROBLEM Dx:        MEDICATIONS  (STANDING):  atorvastatin 20 milliGRAM(s) Oral at bedtime  buDESOnide   90 MICROgram(s) Inhaler 2 Puff(s) Inhalation two times a day  captopril 25 milliGRAM(s) Oral every 8 hours  chlorhexidine 2% Cloths 1 Application(s) Topical daily  latanoprost 0.005% Ophthalmic Solution 1 Drop(s) Both EYES at bedtime  polyethylene glycol 3350 17 Gram(s) Oral two times a day  propranolol 80 milliGRAM(s) Oral daily  senna 1 Tablet(s) Oral daily  sodium chloride 2 Gram(s) Oral every 12 hours  sodium chloride 1.5%. 500 milliLiter(s) (50 mL/Hr) IV Continuous <Continuous>  tamsulosin 0.4 milliGRAM(s) Oral at bedtime    MEDICATIONS  (PRN):    Vital Signs Last 24 Hrs  T(C): 35.9 (03 Mar 2024 08:00), Max: 36.3 (03 Mar 2024 00:00)  T(F): 96.7 (03 Mar 2024 08:00), Max: 97.4 (03 Mar 2024 00:00)  HR: 56 (03 Mar 2024 09:00) (52 - 79)  BP: 99/67 (03 Mar 2024 09:00) (89/73 - 143/60)  BP(mean): 67 (03 Mar 2024 09:00) (66 - 111)  RR: 16 (03 Mar 2024 09:00) (14 - 25)  SpO2: 100% (03 Mar 2024 09:00) (92% - 100%)    Parameters below as of 03 Mar 2024 08:00  Patient On (Oxygen Delivery Method): room air      Daily     Daily     PHYSICAL EXAM:  Constitutional: He appears comfortable and not distressed. Not diaphoretic.    Neck:  The thyroid is normal. Trachea is midline.     Breasts: Normal examination.    Respiratory: The lungs are clear to auscultation. No dullness and expansion is normal.    Cardiovascular: S1 and S2 are normal. No murmurs rubs or gallops are present.    Gastrointestinal: Large left groin ecchymoses The abdomen is soft. No tenderness is present.     Genitourinary: The bladder is not distended. No CVA tenderness is present.    Extremities: No edema is noted. No deformities are present.    Neurological: Cognition is normal. Tone, power and sensation are normal.     Skin: No lesions are seen  or palpated.    Lymph Nodes: No lymphadenopathy is present.                            9.3    12.92 )-----------( 267      ( 03 Mar 2024 02:00 )             25.3     03-03    115<LL>  |  85<L>  |  15  ----------------------------<  95  6.4<HH>   |  20<L>  |  0.67    Ca    8.1<L>      03 Mar 2024 08:32  Phos  2.9     03-03  Mg     2.10     03-03    TPro  5.4<L>  /  Alb  3.1<L>  /  TBili  7.9<H>  /  DBili  1.1<H>  /  AST  32  /  ALT  21  /  AlkPhos  103  03-02    Osmolality, Serum: 233 mosm/kg (03.01.24 @ 02:01)   Osmolality, Random Urine: 596 mosm/kg (03.01.24 @ 02:01)   Potassium, Random Urine: 66.6 mmol/L (03.01.24 @ 02:01)   Protein/Creatinine Ratio Calculation: 0.2 Ratio (03.01.24 @ 02:01)   Sodium, Random Urine: 41 mmol/L (03.01.24 @ 02:01)

## 2024-03-03 NOTE — PROGRESS NOTE ADULT - SUBJECTIVE AND OBJECTIVE BOX
Surgery Progress Note    OVERNIGHT EVENTS: NAEO    SUBJECTIVE: Pt seen and examined at bedside. Patient comfortable and in no-apparent distress.     Vital Signs Last 24 Hrs  T(C): 36.3 (03 Mar 2024 00:00), Max: 36.3 (03 Mar 2024 00:00)  T(F): 97.4 (03 Mar 2024 00:00), Max: 97.4 (03 Mar 2024 00:00)  HR: 79 (03 Mar 2024 05:00) (52 - 79)  BP: 142/78 (03 Mar 2024 05:00) (88/55 - 143/60)  BP(mean): 96 (03 Mar 2024 05:00) (66 - 111)  RR: 17 (03 Mar 2024 05:00) (14 - 25)  SpO2: 100% (03 Mar 2024 05:00) (92% - 100%)    Parameters below as of 02 Mar 2024 19:00  Patient On (Oxygen Delivery Method): room air        PHYSICAL EXAM:  General Appearance: Appears well, NAD  Respiratory: No labored breathing  CV: Pulse regularly present  Abdomen: Soft, extensive bruising on lower abdomen and pelvis, left groin swelling; hard, previous groin incision is intact       INs and OUTs:    03-01-24 @ 07:01  -  03-02-24 @ 07:00  --------------------------------------------------------  IN: 1470 mL / OUT: 735 mL / NET: 735 mL    03-02-24 @ 07:01  -  03-03-24 @ 06:44  --------------------------------------------------------  IN: 1850 mL / OUT: 2375 mL / NET: -525 mL        LABS:                        9.3    12.92 )-----------( 267      ( 03 Mar 2024 02:00 )             25.3     03-03    113<LL>  |  83<L>  |  17  ----------------------------<  105<H>  4.8   |  24  |  0.71    Ca    7.7<L>      03 Mar 2024 02:00  Phos  2.4     03-03  Mg     2.10     03-03    TPro  5.4<L>  /  Alb  3.1<L>  /  TBili  7.9<H>  /  DBili  1.1<H>  /  AST  32  /  ALT  21  /  AlkPhos  103  03-02    PT/INR - ( 02 Mar 2024 20:15 )   PT: 15.7 sec;   INR: 1.40 ratio         PTT - ( 02 Mar 2024 20:15 )  PTT:30.5 sec  Urinalysis Basic - ( 03 Mar 2024 02:00 )    Color: x / Appearance: x / SG: x / pH: x  Gluc: 105 mg/dL / Ketone: x  / Bili: x / Urobili: x   Blood: x / Protein: x / Nitrite: x   Leuk Esterase: x / RBC: x / WBC x   Sq Epi: x / Non Sq Epi: x / Bacteria: x

## 2024-03-03 NOTE — PROGRESS NOTE ADULT - SUBJECTIVE AND OBJECTIVE BOX
SICU Progress Note    INTERVAL EVENTS:   - sodium 111, on 1.5 NS to 50cc  - 1pRBC overnight  - yung placed per A team       SUBJECTIVE: Patient seen and examined at bedside with SICU team,     OBJECTIVE:    Vital Signs Last 24 Hrs  T(C): 36.1 (02 Mar 2024 20:00), Max: 36.1 (02 Mar 2024 20:00)  T(F): 97 (02 Mar 2024 20:00), Max: 97 (02 Mar 2024 20:00)  HR: 65 (03 Mar 2024 00:00) (52 - 71)  BP: 103/77 (03 Mar 2024 00:00) (88/55 - 138/53)  BP(mean): 82 (03 Mar 2024 00:00) (66 - 111)  RR: 17 (03 Mar 2024 00:00) (14 - 23)  SpO2: 99% (03 Mar 2024 00:00) (92% - 100%)    Parameters below as of 02 Mar 2024 19:00  Patient On (Oxygen Delivery Method): room air    I&O's Detail    01 Mar 2024 07:01  -  02 Mar 2024 07:00  --------------------------------------------------------  IN:    IV PiggyBack: 300 mL    Oral Fluid: 150 mL    sodium chloride 1.5%: 1020 mL  Total IN: 1470 mL    OUT:    Voided (mL): 735 mL  Total OUT: 735 mL    Total NET: 735 mL      02 Mar 2024 07:01  -  03 Mar 2024 00:38  --------------------------------------------------------  IN:    Oral Fluid: 500 mL    PRBCs (Packed Red Blood Cells): 300 mL    sodium chloride 1.5%: 850 mL  Total IN: 1650 mL    OUT:    Indwelling Catheter - Urethral (mL): 775 mL    Voided (mL): 250 mL  Total OUT: 1025 mL    Total NET: 625 mL      MEDICATIONS  (STANDING):  atorvastatin 20 milliGRAM(s) Oral at bedtime  buDESOnide   90 MICROgram(s) Inhaler 2 Puff(s) Inhalation two times a day  captopril 25 milliGRAM(s) Oral every 8 hours  chlorhexidine 2% Cloths 1 Application(s) Topical daily  latanoprost 0.005% Ophthalmic Solution 1 Drop(s) Both EYES at bedtime  polyethylene glycol 3350 17 Gram(s) Oral two times a day  propranolol 80 milliGRAM(s) Oral daily  senna 1 Tablet(s) Oral daily  sodium chloride 1.5%. 500 milliLiter(s) (50 mL/Hr) IV Continuous <Continuous>  tamsulosin 0.4 milliGRAM(s) Oral at bedtime    MEDICATIONS  (PRN):      PHYSICAL EXAM:  Constitutional:  NAD  Respiratory: Unlabored breathing  Abdomen: Soft, nondistended, NTTP. No rebound or guarding. Lower abd with echymoses and bruising. Suprapubic area, left testicle and elft groin with ecchymoses and indurated   Extremities: WWP, DIAZ spontaneously    LABS:                        8.1    12.67 )-----------( 277      ( 02 Mar 2024 15:15 )             21.8     03-02    111<LL>  |  81<L>  |  19  ----------------------------<  112<H>  4.5   |  23  |  0.76    Ca    7.9<L>      02 Mar 2024 20:15  Phos  2.5     03-02  Mg     2.10     03-02    TPro  5.4<L>  /  Alb  3.1<L>  /  TBili  7.9<H>  /  DBili  1.1<H>  /  AST  32  /  ALT  21  /  AlkPhos  103  03-02    PT/INR - ( 02 Mar 2024 20:15 )   PT: 15.7 sec;   INR: 1.40 ratio         PTT - ( 02 Mar 2024 20:15 )  PTT:30.5 sec  LIVER FUNCTIONS - ( 02 Mar 2024 01:00 )  Alb: 3.1 g/dL / Pro: 5.4 g/dL / ALK PHOS: 103 U/L / ALT: 21 U/L / AST: 32 U/L / GGT: x           Urinalysis Basic - ( 02 Mar 2024 20:15 )    Color: x / Appearance: x / SG: x / pH: x  Gluc: 112 mg/dL / Ketone: x  / Bili: x / Urobili: x   Blood: x / Protein: x / Nitrite: x   Leuk Esterase: x / RBC: x / WBC x   Sq Epi: x / Non Sq Epi: x / Bacteria: x      ABO Interpretation: B (03-02-24 @ 20:52)      IMAGING:

## 2024-03-03 NOTE — PROGRESS NOTE ADULT - ASSESSMENT
79M w/ PMH of Afib on Eliquis, s/p recent left inguinal hernia repair 7 days ago. 2 recent falls without headstrike, one on tuesday, one earlier today, was sent to ER by surgeon following seeing extensive bruising from waist to bilateral thighs.  Passing gas, denies groin pain, does have some swelling right groin, normal bowel function with soft brown BM today. No HS with fall, fell onto buttocks both times.  Pt reports mild lightheadedness and weakness of legs.  Nephrology consulted for hyponatremia.    A/P:  Hyponatremia:  Na reamins  low but improving at acceptable rate.  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 He is euvolemic and asymptomatic.  Pt reports he has been drinking more than 8 glasses of water daily for years.  Recent decreased solid intake d/t hernia repair.  His Urine /Plasma Electrolyte ratio was 1.000. Hence he may need help other than fluid restriction for correction.  - Fluid restriction to 1L/day - pt educated.  - Na tabs.  - Hold Saline for now.  - Goal of Na in next 24 hrs is between 114-116.  - Monitor Na q6hrs till its more than 120.        HTN:  Fluctuating.  Resume home meds.  Management per ICU.  Monitor BP.    Anemia:  Workup and management per ICU.  Transfuse for Hgb <8.  Monitor Hgb.    Hypocalcemia:  Consider checking Vit. D level.  Albumin level is low   Replete per ICU.  Monitor Ca.    Hypophosphatemia:  Likely sec to poor intake of solids.  Repleted by ICU w/ Kphos 15mmol.  Replete as needed.    Proteinuria:  Trace on UA.  UPr/Cr 0.2gm.  Monitor.  Monitor PO4.

## 2024-03-03 NOTE — PROGRESS NOTE ADULT - ASSESSMENT
Assessment: 79 year old man with afib on eliquis and recent LIHR 2/22/24 by Dr. Bain, now with falls x2 after resuming eliquis. Hyponatremia to 106 on BMP at admission, patient admitted to SICU for management.     Plan:  - Holding eliquis  - Diet: regualr  - Miralax  - Appreciate nephro recs  - Excellent care per SICU    A Team Surgery   [id number]

## 2024-03-04 LAB
ANION GAP SERPL CALC-SCNC: 11 MMOL/L — SIGNIFICANT CHANGE UP (ref 7–14)
ANION GAP SERPL CALC-SCNC: 8 MMOL/L — SIGNIFICANT CHANGE UP (ref 7–14)
BUN SERPL-MCNC: 13 MG/DL — SIGNIFICANT CHANGE UP (ref 7–23)
BUN SERPL-MCNC: 13 MG/DL — SIGNIFICANT CHANGE UP (ref 7–23)
CALCIUM SERPL-MCNC: 8.2 MG/DL — LOW (ref 8.4–10.5)
CALCIUM SERPL-MCNC: 8.4 MG/DL — SIGNIFICANT CHANGE UP (ref 8.4–10.5)
CHLORIDE SERPL-SCNC: 87 MMOL/L — LOW (ref 98–107)
CHLORIDE SERPL-SCNC: 88 MMOL/L — LOW (ref 98–107)
CO2 SERPL-SCNC: 23 MMOL/L — SIGNIFICANT CHANGE UP (ref 22–31)
CO2 SERPL-SCNC: 25 MMOL/L — SIGNIFICANT CHANGE UP (ref 22–31)
CREAT SERPL-MCNC: 0.7 MG/DL — SIGNIFICANT CHANGE UP (ref 0.5–1.3)
CREAT SERPL-MCNC: 0.76 MG/DL — SIGNIFICANT CHANGE UP (ref 0.5–1.3)
EGFR: 91 ML/MIN/1.73M2 — SIGNIFICANT CHANGE UP
EGFR: 94 ML/MIN/1.73M2 — SIGNIFICANT CHANGE UP
GLUCOSE BLDC GLUCOMTR-MCNC: 101 MG/DL — HIGH (ref 70–99)
GLUCOSE BLDC GLUCOMTR-MCNC: 108 MG/DL — HIGH (ref 70–99)
GLUCOSE BLDC GLUCOMTR-MCNC: 133 MG/DL — HIGH (ref 70–99)
GLUCOSE SERPL-MCNC: 108 MG/DL — HIGH (ref 70–99)
GLUCOSE SERPL-MCNC: 108 MG/DL — HIGH (ref 70–99)
HCT VFR BLD CALC: 28.6 % — LOW (ref 39–50)
HGB BLD-MCNC: 10.3 G/DL — LOW (ref 13–17)
MAGNESIUM SERPL-MCNC: 1.9 MG/DL — SIGNIFICANT CHANGE UP (ref 1.6–2.6)
MAGNESIUM SERPL-MCNC: 1.9 MG/DL — SIGNIFICANT CHANGE UP (ref 1.6–2.6)
MCHC RBC-ENTMCNC: 31.5 PG — SIGNIFICANT CHANGE UP (ref 27–34)
MCHC RBC-ENTMCNC: 36 GM/DL — SIGNIFICANT CHANGE UP (ref 32–36)
MCV RBC AUTO: 87.5 FL — SIGNIFICANT CHANGE UP (ref 80–100)
NRBC # BLD: 0 /100 WBCS — SIGNIFICANT CHANGE UP (ref 0–0)
NRBC # FLD: 0 K/UL — SIGNIFICANT CHANGE UP (ref 0–0)
PHOSPHATE SERPL-MCNC: 2.7 MG/DL — SIGNIFICANT CHANGE UP (ref 2.5–4.5)
PHOSPHATE SERPL-MCNC: 3 MG/DL — SIGNIFICANT CHANGE UP (ref 2.5–4.5)
PLATELET # BLD AUTO: 288 K/UL — SIGNIFICANT CHANGE UP (ref 150–400)
POTASSIUM SERPL-MCNC: 4.7 MMOL/L — SIGNIFICANT CHANGE UP (ref 3.5–5.3)
POTASSIUM SERPL-MCNC: 4.8 MMOL/L — SIGNIFICANT CHANGE UP (ref 3.5–5.3)
POTASSIUM SERPL-SCNC: 4.7 MMOL/L — SIGNIFICANT CHANGE UP (ref 3.5–5.3)
POTASSIUM SERPL-SCNC: 4.8 MMOL/L — SIGNIFICANT CHANGE UP (ref 3.5–5.3)
RBC # BLD: 3.27 M/UL — LOW (ref 4.2–5.8)
RBC # FLD: 14.6 % — HIGH (ref 10.3–14.5)
SODIUM SERPL-SCNC: 121 MMOL/L — LOW (ref 135–145)
SODIUM SERPL-SCNC: 121 MMOL/L — LOW (ref 135–145)
T3 SERPL-MCNC: 60 NG/DL — LOW (ref 80–200)
T4 AB SER-ACNC: 6.3 UG/DL — SIGNIFICANT CHANGE UP (ref 5.1–13)
T4 FREE SERPL-MCNC: 1.2 NG/DL — SIGNIFICANT CHANGE UP (ref 0.9–1.8)
TSH SERPL-MCNC: 2.38 UIU/ML — SIGNIFICANT CHANGE UP (ref 0.27–4.2)
VIT D25+D1,25 OH+D1,25 PNL SERPL-MCNC: 21.8 PG/ML — SIGNIFICANT CHANGE UP (ref 19.9–79.3)
WBC # BLD: 18.02 K/UL — HIGH (ref 3.8–10.5)
WBC # FLD AUTO: 18.02 K/UL — HIGH (ref 3.8–10.5)

## 2024-03-04 PROCEDURE — 99232 SBSQ HOSP IP/OBS MODERATE 35: CPT | Mod: GC

## 2024-03-04 RX ORDER — PIPERACILLIN AND TAZOBACTAM 4; .5 G/20ML; G/20ML
3.38 INJECTION, POWDER, LYOPHILIZED, FOR SOLUTION INTRAVENOUS ONCE
Refills: 0 | Status: COMPLETED | OUTPATIENT
Start: 2024-03-04 | End: 2024-03-04

## 2024-03-04 RX ORDER — ENOXAPARIN SODIUM 100 MG/ML
40 INJECTION SUBCUTANEOUS EVERY 24 HOURS
Refills: 0 | Status: DISCONTINUED | OUTPATIENT
Start: 2024-03-04 | End: 2024-03-08

## 2024-03-04 RX ORDER — PIPERACILLIN AND TAZOBACTAM 4; .5 G/20ML; G/20ML
3.38 INJECTION, POWDER, LYOPHILIZED, FOR SOLUTION INTRAVENOUS EVERY 8 HOURS
Refills: 0 | Status: DISCONTINUED | OUTPATIENT
Start: 2024-03-04 | End: 2024-03-10

## 2024-03-04 RX ADMIN — TAMSULOSIN HYDROCHLORIDE 0.4 MILLIGRAM(S): 0.4 CAPSULE ORAL at 21:56

## 2024-03-04 RX ADMIN — LATANOPROST 1 DROP(S): 0.05 SOLUTION/ DROPS OPHTHALMIC; TOPICAL at 21:56

## 2024-03-04 RX ADMIN — SODIUM CHLORIDE 2 GRAM(S): 9 INJECTION INTRAMUSCULAR; INTRAVENOUS; SUBCUTANEOUS at 17:16

## 2024-03-04 RX ADMIN — CHLORHEXIDINE GLUCONATE 1 APPLICATION(S): 213 SOLUTION TOPICAL at 12:56

## 2024-03-04 RX ADMIN — CAPTOPRIL 25 MILLIGRAM(S): 12.5 TABLET ORAL at 17:16

## 2024-03-04 RX ADMIN — SODIUM CHLORIDE 2 GRAM(S): 9 INJECTION INTRAMUSCULAR; INTRAVENOUS; SUBCUTANEOUS at 05:45

## 2024-03-04 RX ADMIN — Medication 2 PUFF(S): at 22:15

## 2024-03-04 RX ADMIN — CAPTOPRIL 25 MILLIGRAM(S): 12.5 TABLET ORAL at 05:45

## 2024-03-04 RX ADMIN — Medication 2 PUFF(S): at 09:14

## 2024-03-04 RX ADMIN — PIPERACILLIN AND TAZOBACTAM 25 GRAM(S): 4; .5 INJECTION, POWDER, LYOPHILIZED, FOR SOLUTION INTRAVENOUS at 13:14

## 2024-03-04 RX ADMIN — POLYETHYLENE GLYCOL 3350 17 GRAM(S): 17 POWDER, FOR SOLUTION ORAL at 17:16

## 2024-03-04 RX ADMIN — PIPERACILLIN AND TAZOBACTAM 200 GRAM(S): 4; .5 INJECTION, POWDER, LYOPHILIZED, FOR SOLUTION INTRAVENOUS at 09:34

## 2024-03-04 RX ADMIN — SENNA PLUS 1 TABLET(S): 8.6 TABLET ORAL at 12:56

## 2024-03-04 RX ADMIN — ATORVASTATIN CALCIUM 20 MILLIGRAM(S): 80 TABLET, FILM COATED ORAL at 21:56

## 2024-03-04 RX ADMIN — PIPERACILLIN AND TAZOBACTAM 25 GRAM(S): 4; .5 INJECTION, POWDER, LYOPHILIZED, FOR SOLUTION INTRAVENOUS at 21:56

## 2024-03-04 RX ADMIN — POLYETHYLENE GLYCOL 3350 17 GRAM(S): 17 POWDER, FOR SOLUTION ORAL at 05:44

## 2024-03-04 RX ADMIN — ENOXAPARIN SODIUM 40 MILLIGRAM(S): 100 INJECTION SUBCUTANEOUS at 12:55

## 2024-03-04 RX ADMIN — CAPTOPRIL 25 MILLIGRAM(S): 12.5 TABLET ORAL at 12:56

## 2024-03-04 NOTE — DIETITIAN INITIAL EVALUATION ADULT - OTHER INFO
80 y/o male with hx HTN, inguinal hernia, HLD and A-FIb admitted with hyponatremia. Visited with pt and family at bedside to obtain nutrition hx. Pt reported that he is currently eating well since he had his hernia repair a week and a half ago. Prior to that he had diminished oral intake because he had continual early satiety, GERD and reflux which would limit the amount of food he would consume at a given time. He subsequently lost 20 lbs over the past 6 months with UBW of 230 lbs about 6 months ago and most recent wt recalled  lbs. Current admit wt likely inaccurate and reflective of additional fluid accumulation. Pt presents at moderate risk for malnutrition based on inadequate nutrition intake with 9% weight loss over the past 6 months. Pt denies food allergies, nausea/vomiting/diarrhea/constipation, or issues with chewing/swallowing. Food preferences taken and menu alternatives discussed. Offered oral supplementation with Magic Cups (290 kcal, 9 gm protein per 4 oz) x 2/day which patient is amenable towards trying. This supplement comes up on patient's meal trays and does not require an order. Pt without additional nutrition related issues or concerns at this time. Encourage and monitor oral intake, especially of nutrition supplement. RDN services to remain available as needed.

## 2024-03-04 NOTE — PROGRESS NOTE ADULT - SUBJECTIVE AND OBJECTIVE BOX
[facility]   MD DON WHITE MD MARIA SANTIAGO, NP    TEL:  OFFICE: [phone]  From 5pm-7am Answering Service [phone]    -- RENAL FOLLOW UP NOTE ---Date of Service 03-04-24 @ 16:33    Patient is a 79y old  Male who presents with a chief complaint of hyponatremia (01 Mar 2024 12:23)      Patient seen and examined at bedside. No chest pain/sob    VITALS:  T(F): 96.9 (03-04-24 @ 16:00), Max: 97.1 (03-04-24 @ 12:00)  HR: 66 (03-04-24 @ 16:00)  BP: 122/51 (03-04-24 @ 16:00)  RR: 13 (03-04-24 @ 16:00)  SpO2: 100% (03-04-24 @ 16:00)  Wt(kg): --    03-03 @ 07:01  -  03-04 @ 07:00  --------------------------------------------------------  IN: 330 mL / OUT: 3125 mL / NET: -2795 mL    03-04 @ 07:01  -  03-04 @ 16:33  --------------------------------------------------------  IN: 800 mL / OUT: 700 mL / NET: 100 mL          PHYSICAL EXAM:  General: NAD  Neck: No JVD  Respiratory: CTAB, no wheezes, rales or rhonchi  Cardiovascular: S1, S2, RRR  Gastrointestinal: BS+, soft, NT/ND  Extremities: No peripheral edema    Hospital Medications:   MEDICATIONS  (STANDING):  atorvastatin 20 milliGRAM(s) Oral at bedtime  buDESOnide   90 MICROgram(s) Inhaler 2 Puff(s) Inhalation two times a day  captopril 25 milliGRAM(s) Oral <User Schedule>  chlorhexidine 2% Cloths 1 Application(s) Topical daily  enoxaparin Injectable 40 milliGRAM(s) SubCutaneous every 24 hours  latanoprost 0.005% Ophthalmic Solution 1 Drop(s) Both EYES at bedtime  piperacillin/tazobactam IVPB.. 3.375 Gram(s) IV Intermittent every 8 hours  polyethylene glycol 3350 17 Gram(s) Oral two times a day  propranolol 80 milliGRAM(s) Oral daily  senna 1 Tablet(s) Oral daily  sodium chloride 2 Gram(s) Oral every 12 hours  tamsulosin 0.4 milliGRAM(s) Oral at bedtime      LABS:  03-04    121<L>  |  88<L>  |  13  ----------------------------<  108<H>  4.8   |  25  |  0.76    Ca    8.2<L>      04 Mar 2024 08:30  Phos  3.0     03-04  Mg     1.90     03-04      Creatinine Trend: 0.76 <--, 0.70 <--, 0.70 <--, 0.76 <--, 0.74 <--, 0.67 <--, 0.71 <--, 0.76 <--, 0.88 <--, 0.67 <--, 0.74 <--, 0.70 <--, 0.61 <--, 0.58 <--, 0.59 <--, 0.61 <--    Phosphorus: 3.0 mg/dL (03-04 @ 08:30)  Phosphorus: 2.7 mg/dL (03-04 @ 02:10)  Phosphorus: 2.8 mg/dL (03-03 @ 21:45)                              10.3   18.02 )-----------( 288      ( 04 Mar 2024 02:10 )             28.6     Urine Studies:  Urinalysis - [03-04-24 @ 08:30]      Color  / Appearance  / SG  / pH       Gluc 108 / Ketone   / Bili  / Urobili        Blood  / Protein  / Leuk Est  / Nitrite       RBC  / WBC  / Hyaline  / Gran  / Sq Epi  / Non Sq Epi  / Bacteria     Urine Creatinine 99      [03-01-24 @ 02:01]  Urine Protein 25      [03-01-24 @ 02:01]  Urine Sodium 41      [03-01-24 @ 02:01]  Urine Urea Nitrogen 990.0      [03-01-24 @ 02:01]  Urine Potassium 66.6      [03-01-24 @ 02:01]  Urine Osmolality 596      [03-01-24 @ 02:01]    TSH 2.38      [03-04-24 @ 02:10]        RADIOLOGY & ADDITIONAL STUDIES:

## 2024-03-04 NOTE — PROGRESS NOTE ADULT - ASSESSMENT
Assessment: 79 year old man with afib on eliquis and recent LIHR 2/22/24 by Dr. Bain, now with falls x2 after resuming Eliquis Hyponatremia to 106 on BMP at admission, patient admitted to SICU for management.     Plan:  - Holding Eliquis  - Diet: regular  - Miralax  - Appreciate nephro recs  - Excellent care per SICU    A Team Surgery   [phone]

## 2024-03-04 NOTE — PROGRESS NOTE ADULT - ASSESSMENT
79M w/ PMH of Afib on Eliquis, s/p recent left inguinal hernia repair 7 days ago. 2 recent falls without headstrike, one on tuesday, one earlier today, was sent to ER by surgeon following seeing extensive bruising from waist to bilateral thighs.  Passing gas, denies groin pain, does have some swelling right groin, normal bowel function with soft brown BM today. No HS with fall, fell onto buttocks both times.  Pt reports mild lightheadedness and weakness of legs.  Nephrology consulted for hyponatremia.    A/P:  Hyponatremia:  Na remains low but improving at acceptable rate.  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 He is euvolemic and asymptomatic.  Pt reports he has been drinking more than 8 glasses of water daily for years.  Recent decreased solid intake d/t hernia repair.  His Urine /Plasma Electrolyte ratio was 1.000. Hence he may need help other than fluid restriction for correction.  Fluid restriction to 1L/day - pt educated.  Continue Na tabs.  Hold Saline for now.  Goal of Na in next 24 hrs is between 114-116.  Monitor Na q12hrs     HTN:  Fluctuating.  c/w BP meds  Management per ICU.  Monitor BP.    Anemia:  Workup and management per ICU.  Transfuse for Hgb <8.  Monitor Hgb.    Hypocalcemia:  Consider checking Vit. D level.  Albumin level is low   Replete per ICU.  Monitor Ca.    Hypophosphatemia:  Likely sec to poor intake of solids.  Repleted by ICU w/ Kphos 15mmol 3/3  Replete as needed.  Monitor    Proteinuria:  Trace on UA.  UPr/Cr 0.2gm.  Monitor.       79M w/ PMH of Afib on Eliquis, s/p recent left inguinal hernia repair 7 days ago. 2 recent falls without headstrike, one on tuesday, one earlier today, was sent to ER by surgeon following seeing extensive bruising from waist to bilateral thighs.  Passing gas, denies groin pain, does have some swelling right groin, normal bowel function with soft brown BM today. No HS with fall, fell onto buttocks both times.  Pt reports mild lightheadedness and weakness of legs.  Nephrology consulted for hyponatremia.    A/P:  Hyponatremia:  Na remains low but improving at acceptable rate.  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 He is euvolemic and asymptomatic.  Pt reports he has been drinking more than 8 glasses of water daily for years.  Recent decreased solid intake d/t hernia repair.  His Urine /Plasma Electrolyte ratio was 1.000. Hence he may need help other than fluid restriction for correction.  Fluid restriction to 1L/day - pt educated.  Continue Na tabs 2gm Q12.  Hold Saline for now.  Goal of Na in next 24 hrs is between 127-129.  Monitor Na q12hrs     HTN:  Fluctuating.  c/w BP meds  Management per ICU.  Monitor BP.    Anemia:  Workup and management per ICU.  Transfuse for Hgb <8.  Monitor Hgb.    Hypocalcemia:  Consider checking Vit. D level.  Albumin level is low   Replete per ICU.  Monitor Ca.    Hypophosphatemia:  Likely sec to poor intake of solids.  Repleted by ICU w/ Kphos 15mmol 3/3  Replete as needed.  Monitor    Proteinuria:  Trace on UA.  UPr/Cr 0.2gm.  Monitor.

## 2024-03-04 NOTE — PROGRESS NOTE ADULT - SUBJECTIVE AND OBJECTIVE BOX
Cardiovascular Disease Progress Note    Overnight events: No acute events overnight.  no new cardiac sx  Otherwise review of systems negative    Objective Findings:  T(C): 35.9 (03-04-24 @ 00:00), Max: 36.1 (03-03-24 @ 16:00)  HR: 79 (03-04-24 @ 00:00) (56 - 79)  BP: 140/56 (03-04-24 @ 00:00) (99/67 - 141/73)  RR: 20 (03-04-24 @ 00:00) (16 - 20)  SpO2: 99% (03-04-24 @ 00:00) (98% - 100%)  Wt(kg): --  Daily     Daily       Physical Exam:  Gen: NAD  HEENT: EOMI  CV: RRR, normal S1 + S2, no m/r/g  Lungs: CTAB  Abd: soft, non-tender  Ext: No edema    Telemetry:    Laboratory Data:                        10.3   18.02 )-----------( 288      ( 04 Mar 2024 02:10 )             28.6     03-04    121<L>  |  87<L>  |  13  ----------------------------<  108<H>  4.7   |  23  |  0.70    Ca    8.4      04 Mar 2024 02:10  Phos  2.7     03-04  Mg     1.90     03-04      PT/INR - ( 02 Mar 2024 20:15 )   PT: 15.7 sec;   INR: 1.40 ratio         PTT - ( 02 Mar 2024 20:15 )  PTT:30.5 sec          Inpatient Medications:  MEDICATIONS  (STANDING):  atorvastatin 20 milliGRAM(s) Oral at bedtime  buDESOnide   90 MICROgram(s) Inhaler 2 Puff(s) Inhalation two times a day  captopril 25 milliGRAM(s) Oral <User Schedule>  chlorhexidine 2% Cloths 1 Application(s) Topical daily  latanoprost 0.005% Ophthalmic Solution 1 Drop(s) Both EYES at bedtime  polyethylene glycol 3350 17 Gram(s) Oral two times a day  propranolol 80 milliGRAM(s) Oral daily  senna 1 Tablet(s) Oral daily  sodium chloride 2 Gram(s) Oral every 12 hours  tamsulosin 0.4 milliGRAM(s) Oral at bedtime      Assessment:  79y Male w pmhx of Afib on Eliquis s/p recent LIHR  and 2 recent falls w extensive bruising and associated groin and scrotal hematoma. Incidentally, labs found pt to be hyponatremic    Recs:  cardiac stable  care per sicu  vol status stable. fluid restriction per renal  AC on hold for persistent afib, resume when safe. to consider watchman evaluation as outpatient to allow for safe discontinuation of AC  tele monitoring  hyponatremia, tx per renal, recs appreciated. slowly improving  transfuse prn to hg > 7  dvt ppx as able          Over 25 minutes spent on total encounter; more than 50% of the visit was spent counseling and/or coordinating care by the attending physician.      Steve Gu MD   Cardiovascular Disease  [phone]

## 2024-03-04 NOTE — PROGRESS NOTE ADULT - SUBJECTIVE AND OBJECTIVE BOX
Surgery Progress Note    OVERNIGHT EVENTS: NAEO    SUBJECTIVE: Pt seen and examined at bedside. Patient comfortable and in no-apparent distress. Pain is controlled. Patient tolerating a regular diet without N/v    Vital Signs Last 24 Hrs  T(C): 35.9 (04 Mar 2024 00:00), Max: 36.1 (03 Mar 2024 16:00)  T(F): 96.6 (04 Mar 2024 00:00), Max: 97 (03 Mar 2024 16:00)  HR: 79 (04 Mar 2024 00:00) (56 - 79)  BP: 140/56 (04 Mar 2024 00:00) (99/67 - 141/73)  BP(mean): 78 (04 Mar 2024 00:00) (67 - 93)  RR: 20 (04 Mar 2024 00:00) (16 - 24)  SpO2: 99% (04 Mar 2024 00:00) (97% - 100%)    Parameters below as of 03 Mar 2024 16:00  Patient On (Oxygen Delivery Method): room air        PHYSICAL EXAM:  General Appearance: Appears well, NAD  Respiratory: No labored breathing  CV: Pulse regularly present  Abdomen: Soft, nondistended, NTTP. No rebound or guarding. Lower abd with echymoses and bruising. Suprapubic area, left testicle and elft groin with ecchymoses and indurated     INs and OUTs:    03-02-24 @ 07:01  -  03-03-24 @ 07:00  --------------------------------------------------------  IN: 1900 mL / OUT: 2475 mL / NET: -575 mL    03-03-24 @ 07:01  -  03-04-24 @ 05:42  --------------------------------------------------------  IN: 330 mL / OUT: 3125 mL / NET: -2795 mL        LABS:                        10.3   18.02 )-----------( 288      ( 04 Mar 2024 02:10 )             28.6     03-04    121<L>  |  87<L>  |  13  ----------------------------<  108<H>  4.7   |  23  |  0.70    Ca    8.4      04 Mar 2024 02:10  Phos  2.7     03-04  Mg     1.90     03-04      PT/INR - ( 02 Mar 2024 20:15 )   PT: 15.7 sec;   INR: 1.40 ratio         PTT - ( 02 Mar 2024 20:15 )  PTT:30.5 sec  Urinalysis Basic - ( 04 Mar 2024 02:10 )    Color: x / Appearance: x / SG: x / pH: x  Gluc: 108 mg/dL / Ketone: x  / Bili: x / Urobili: x   Blood: x / Protein: x / Nitrite: x   Leuk Esterase: x / RBC: x / WBC x   Sq Epi: x / Non Sq Epi: x / Bacteria: x

## 2024-03-04 NOTE — DIETITIAN INITIAL EVALUATION ADULT - PERTINENT LABORATORY DATA
03-04    121<L>  |  88<L>  |  13  ----------------------------<  108<H>  4.8   |  25  |  0.76    Ca    8.2<L>      04 Mar 2024 08:30  Phos  3.0     03-04  Mg     1.90     03-04    POCT Blood Glucose.: 101 mg/dL (03-04-24 @ 05:54)   Xeljanz Counseling: I discussed with the patient the risks of Xeljanz therapy including increased risk of infection, liver issues, headache, diarrhea, or cold symptoms. Live vaccines should be avoided. They were instructed to call if they have any problems.

## 2024-03-04 NOTE — PROGRESS NOTE ADULT - SUBJECTIVE AND OBJECTIVE BOX
SICU Daily Progress Note  =====================================================  Interval/Overnight Events:       - Hyperkalemic overnight 5.6 --> EKG without peaked T waves, s/p insulin 5 units and D50 shift  - Na improving 120    ALLERGIES:  tetracycline (Hives; Rash)      --------------------------------------------------------------------------------------    MEDICATIONS:    Neurologic Medications    Respiratory Medications  buDESOnide   90 MICROgram(s) Inhaler 2 Puff(s) Inhalation two times a day    Cardiovascular Medications  captopril 25 milliGRAM(s) Oral <User Schedule>  propranolol 80 milliGRAM(s) Oral daily    Gastrointestinal Medications  polyethylene glycol 3350 17 Gram(s) Oral two times a day  senna 1 Tablet(s) Oral daily  sodium chloride 2 Gram(s) Oral every 12 hours    Genitourinary Medications  tamsulosin 0.4 milliGRAM(s) Oral at bedtime    Hematologic/Oncologic Medications    Antimicrobial/Immunologic Medications    Endocrine/Metabolic Medications  atorvastatin 20 milliGRAM(s) Oral at bedtime    Topical/Other Medications  chlorhexidine 2% Cloths 1 Application(s) Topical daily  latanoprost 0.005% Ophthalmic Solution 1 Drop(s) Both EYES at bedtime    --------------------------------------------------------------------------------------    VITAL SIGNS:  T(C): 36 (03-03-24 @ 20:00), Max: 36.1 (03-03-24 @ 16:00)  HR: 68 (03-03-24 @ 21:52) (56 - 79)  BP: 141/73 (03-03-24 @ 20:00) (99/67 - 143/60)  RR: 19 (03-03-24 @ 20:00) (16 - 25)  SpO2: 100% (03-03-24 @ 21:52) (97% - 100%)  --------------------------------------------------------------------------------------    INS AND OUTS:    03-02-24 @ 07:01  -  03-03-24 @ 07:00  --------------------------------------------------------  IN: 1900 mL / OUT: 2475 mL / NET: -575 mL    03-03-24 @ 07:01  -  03-04-24 @ 00:16  --------------------------------------------------------  IN: 330 mL / OUT: 1525 mL / NET: -1195 mL      --------------------------------------------------------------------------------------    EXAM    NEURO: RAMONA LOVETT: NC/AT  RESPIRATORY: nonlabored respirations, normal CW expansion  CARDIO: RRR, S1S2  ABDOMEN: soft, nontender, nondistended, pelvic and scrotal hematoma with improving ecchymosis  EXTREMITIES: normal strength, no deformities    --------------------------------------------------------------------------------------    LABS                        10.4   14.28 )-----------( 313      ( 03 Mar 2024 15:05 )             28.9   03-03    120<LL>  |  87<L>  |  16  ----------------------------<  113<H>  5.6<H>   |  25  |  0.70    Ca    8.2<L>      03 Mar 2024 21:45  Phos  2.8     03-03  Mg     2.00     03-03    TPro  5.4<L>  /  Alb  3.1<L>  /  TBili  7.9<H>  /  DBili  1.1<H>  /  AST  32  /  ALT  21  /  AlkPhos  103  03-02      -------------------------------------------------------------------------------------- SICU Daily Progress Note  =====================================================  Interval/Overnight Events:       - Hyperkalemic overnight 5.6 --> EKG without peaked T waves, s/p insulin 5 units and D50 shift  - Na improving 120    ALLERGIES:  tetracycline (Hives; Rash)    Subjective:  Patient is in no acute distress, does not complain of fever, chills, nausea, vomiting, dizziness  --------------------------------------------------------------------------------------    MEDICATIONS:    Neurologic Medications    Respiratory Medications  buDESOnide   90 MICROgram(s) Inhaler 2 Puff(s) Inhalation two times a day    Cardiovascular Medications  captopril 25 milliGRAM(s) Oral <User Schedule>  propranolol 80 milliGRAM(s) Oral daily    Gastrointestinal Medications  polyethylene glycol 3350 17 Gram(s) Oral two times a day  senna 1 Tablet(s) Oral daily  sodium chloride 2 Gram(s) Oral every 12 hours    Genitourinary Medications  tamsulosin 0.4 milliGRAM(s) Oral at bedtime    Hematologic/Oncologic Medications    Antimicrobial/Immunologic Medications    Endocrine/Metabolic Medications  atorvastatin 20 milliGRAM(s) Oral at bedtime    Topical/Other Medications  chlorhexidine 2% Cloths 1 Application(s) Topical daily  latanoprost 0.005% Ophthalmic Solution 1 Drop(s) Both EYES at bedtime    --------------------------------------------------------------------------------------    VITAL SIGNS:  T(C): 36 (03-03-24 @ 20:00), Max: 36.1 (03-03-24 @ 16:00)  HR: 68 (03-03-24 @ 21:52) (56 - 79)  BP: 141/73 (03-03-24 @ 20:00) (99/67 - 143/60)  RR: 19 (03-03-24 @ 20:00) (16 - 25)  SpO2: 100% (03-03-24 @ 21:52) (97% - 100%)  --------------------------------------------------------------------------------------    INS AND OUTS:    03-02-24 @ 07:01  -  03-03-24 @ 07:00  --------------------------------------------------------  IN: 1900 mL / OUT: 2475 mL / NET: -575 mL    03-03-24 @ 07:01  -  03-04-24 @ 00:16  --------------------------------------------------------  IN: 330 mL / OUT: 1525 mL / NET: -1195 mL      --------------------------------------------------------------------------------------    EXAM    NEURO: NAD, A&Ox3  HEENT: NC/AT  RESPIRATORY: nonlabored respirations, normal symmetric CW expansion  CARDIO: RRR, S1S2  ABDOMEN: soft, nontender, nondistended, pelvic and scrotal hematoma with improving ecchymosis  EXTREMITIES: normal strength, no deformities    --------------------------------------------------------------------------------------    LABS                        10.4   14.28 )-----------( 313      ( 03 Mar 2024 15:05 )             28.9   03-03    120<LL>  |  87<L>  |  16  ----------------------------<  113<H>  5.6<H>   |  25  |  0.70    Ca    8.2<L>      03 Mar 2024 21:45  Phos  2.8     03-03  Mg     2.00     03-03    TPro  5.4<L>  /  Alb  3.1<L>  /  TBili  7.9<H>  /  DBili  1.1<H>  /  AST  32  /  ALT  21  /  AlkPhos  103  03-02      --------------------------------------------------------------------------------------

## 2024-03-04 NOTE — DIETITIAN INITIAL EVALUATION ADULT - NSICDXPASTMEDICALHX_GEN_ALL_CORE_FT
Yes PAST MEDICAL HISTORY:  Afib     Asthma     Constipation     Difficulty hearing     Enlarged prostate     Eye pressure     Hypercholesteremia     Hypertension     Inguinal hernia     Skin cancer     White coat syndrome with diagnosis of hypertension

## 2024-03-04 NOTE — PROGRESS NOTE ADULT - ASSESSMENT
79y Male w pmhx of Afib on Eliquis s/p recent left inguinal hernia repair presents with impressive groin and scrotal hematoma after multiple falls at home postoperatively. Patient incidentally found to be hyponatremic 106s. SICU consulted management    PLAN:   Neurologic:   - A&Ox3 per baseline  - Monitor mental status    Respiratory:   - satting well on RA,     Cardiovascular:   - hold eliquis i/s/o hematoma  - continue captopril, atorvastatin, propranolol     Gastrointestinal/Nutrition:   -regular diet    Renal/Genitourinary:   - severe hypotonic hyponatremia  - s/p 1.5% NS --> Na uptrending 120  - Per nephro, can stop trending BMP q6 hours if Na > 120  - continue flomax   - Nava     Hematologic:   - holding AC  - trend INR qd for elevated INR on admission  - DVT ppx: SCDs only     Infectious Disease:   - no abx needed at this time, trend WBC    Lines/Tubes:  - PIV  - Nava      Endocrine:   - trend glucose on BMP    Disposition:   - Listed for surgical floors     79y Male w pmhx of Afib on Eliquis s/p recent left inguinal hernia repair presents with impressive groin and scrotal hematoma after multiple falls at home postoperatively. Patient incidentally found to be hyponatremic 106s. SICU consulted management    PLAN:   Neurologic:   - A&Ox3 per baseline  - Monitor mental status    Respiratory:   - satting well on RA,     Cardiovascular:   - hold eliquis i/s/o hematoma  - continue captopril, atorvastatin, propranolol     Gastrointestinal/Nutrition:   -regular diet    Renal/Genitourinary:   - severe hypotonic hyponatremia likely 2/2 SIADH  - s/p 1.5% NS --> Na uptrending 120  - Per nephro, can stop trending BMP q6 hours if Na > 120  - continue flomax   - Nava     Hematologic:   - holding AC  - trend INR qd for elevated INR on admission  - DVT ppx: SCDs only     Infectious Disease:   - zosyn 3/4-  - trend WBC    Lines/Tubes:  - PIV  - Nava      Endocrine:   - trend glucose on BMP    Disposition:   - Listed for surgical floors   79y Male w pmhx of Afib on Eliquis s/p recent left inguinal hernia repair presents with impressive groin and scrotal hematoma after multiple falls at home postoperatively. Patient incidentally found to be hyponatremic 106s. 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 Patient incidentally found to be hyponatremic 106s. 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

## 2024-03-04 NOTE — DIETITIAN INITIAL EVALUATION ADULT - PERTINENT MEDS FT
MEDICATIONS  (STANDING):  atorvastatin 20 milliGRAM(s) Oral at bedtime  buDESOnide   90 MICROgram(s) Inhaler 2 Puff(s) Inhalation two times a day  captopril 25 milliGRAM(s) Oral <User Schedule>  chlorhexidine 2% Cloths 1 Application(s) Topical daily  enoxaparin Injectable 40 milliGRAM(s) SubCutaneous every 24 hours  latanoprost 0.005% Ophthalmic Solution 1 Drop(s) Both EYES at bedtime  piperacillin/tazobactam IVPB.. 3.375 Gram(s) IV Intermittent every 8 hours  polyethylene glycol 3350 17 Gram(s) Oral two times a day  propranolol 80 milliGRAM(s) Oral daily  senna 1 Tablet(s) Oral daily  sodium chloride 2 Gram(s) Oral every 12 hours  tamsulosin 0.4 milliGRAM(s) Oral at bedtime    MEDICATIONS  (PRN):

## 2024-03-05 ENCOUNTER — TRANSCRIPTION ENCOUNTER (OUTPATIENT)
Age: 79
End: 2024-03-05

## 2024-03-05 LAB
ANION GAP SERPL CALC-SCNC: 11 MMOL/L — SIGNIFICANT CHANGE UP (ref 7–14)
ANION GAP SERPL CALC-SCNC: 7 MMOL/L — SIGNIFICANT CHANGE UP (ref 7–14)
BUN SERPL-MCNC: 14 MG/DL — SIGNIFICANT CHANGE UP (ref 7–23)
BUN SERPL-MCNC: 16 MG/DL — SIGNIFICANT CHANGE UP (ref 7–23)
CALCIUM SERPL-MCNC: 8.2 MG/DL — LOW (ref 8.4–10.5)
CALCIUM SERPL-MCNC: 8.2 MG/DL — LOW (ref 8.4–10.5)
CHLORIDE SERPL-SCNC: 87 MMOL/L — LOW (ref 98–107)
CHLORIDE SERPL-SCNC: 89 MMOL/L — LOW (ref 98–107)
CO2 SERPL-SCNC: 23 MMOL/L — SIGNIFICANT CHANGE UP (ref 22–31)
CO2 SERPL-SCNC: 26 MMOL/L — SIGNIFICANT CHANGE UP (ref 22–31)
CREAT SERPL-MCNC: 0.74 MG/DL — SIGNIFICANT CHANGE UP (ref 0.5–1.3)
CREAT SERPL-MCNC: 0.77 MG/DL — SIGNIFICANT CHANGE UP (ref 0.5–1.3)
EGFR: 91 ML/MIN/1.73M2 — SIGNIFICANT CHANGE UP
EGFR: 92 ML/MIN/1.73M2 — SIGNIFICANT CHANGE UP
GLUCOSE SERPL-MCNC: 103 MG/DL — HIGH (ref 70–99)
GLUCOSE SERPL-MCNC: 120 MG/DL — HIGH (ref 70–99)
HCT VFR BLD CALC: 26.6 % — LOW (ref 39–50)
HGB BLD-MCNC: 9.8 G/DL — LOW (ref 13–17)
MAGNESIUM SERPL-MCNC: 1.9 MG/DL — SIGNIFICANT CHANGE UP (ref 1.6–2.6)
MAGNESIUM SERPL-MCNC: 1.9 MG/DL — SIGNIFICANT CHANGE UP (ref 1.6–2.6)
MCHC RBC-ENTMCNC: 32 PG — SIGNIFICANT CHANGE UP (ref 27–34)
MCHC RBC-ENTMCNC: 36.8 GM/DL — HIGH (ref 32–36)
MCV RBC AUTO: 86.9 FL — SIGNIFICANT CHANGE UP (ref 80–100)
NRBC # BLD: 0 /100 WBCS — SIGNIFICANT CHANGE UP (ref 0–0)
NRBC # FLD: 0 K/UL — SIGNIFICANT CHANGE UP (ref 0–0)
PHOSPHATE SERPL-MCNC: 3.2 MG/DL — SIGNIFICANT CHANGE UP (ref 2.5–4.5)
PHOSPHATE SERPL-MCNC: 3.3 MG/DL — SIGNIFICANT CHANGE UP (ref 2.5–4.5)
PLATELET # BLD AUTO: 285 K/UL — SIGNIFICANT CHANGE UP (ref 150–400)
POTASSIUM SERPL-MCNC: 4.3 MMOL/L — SIGNIFICANT CHANGE UP (ref 3.5–5.3)
POTASSIUM SERPL-MCNC: 4.6 MMOL/L — SIGNIFICANT CHANGE UP (ref 3.5–5.3)
POTASSIUM SERPL-SCNC: 4.3 MMOL/L — SIGNIFICANT CHANGE UP (ref 3.5–5.3)
POTASSIUM SERPL-SCNC: 4.6 MMOL/L — SIGNIFICANT CHANGE UP (ref 3.5–5.3)
RBC # BLD: 3.06 M/UL — LOW (ref 4.2–5.8)
RBC # FLD: 14.6 % — HIGH (ref 10.3–14.5)
SODIUM SERPL-SCNC: 121 MMOL/L — LOW (ref 135–145)
SODIUM SERPL-SCNC: 122 MMOL/L — LOW (ref 135–145)
WBC # BLD: 17.28 K/UL — HIGH (ref 3.8–10.5)
WBC # FLD AUTO: 17.28 K/UL — HIGH (ref 3.8–10.5)

## 2024-03-05 PROCEDURE — 99232 SBSQ HOSP IP/OBS MODERATE 35: CPT | Mod: GC

## 2024-03-05 RX ORDER — SODIUM CHLORIDE 9 MG/ML
2 INJECTION INTRAMUSCULAR; INTRAVENOUS; SUBCUTANEOUS EVERY 12 HOURS
Refills: 0 | Status: DISCONTINUED | OUTPATIENT
Start: 2024-03-05 | End: 2024-03-05

## 2024-03-05 RX ORDER — SODIUM CHLORIDE 9 MG/ML
3 INJECTION INTRAMUSCULAR; INTRAVENOUS; SUBCUTANEOUS EVERY 12 HOURS
Refills: 0 | Status: DISCONTINUED | OUTPATIENT
Start: 2024-03-05 | End: 2024-03-05

## 2024-03-05 RX ORDER — SODIUM CHLORIDE 9 MG/ML
3 INJECTION INTRAMUSCULAR; INTRAVENOUS; SUBCUTANEOUS EVERY 12 HOURS
Refills: 0 | Status: DISCONTINUED | OUTPATIENT
Start: 2024-03-05 | End: 2024-03-06

## 2024-03-05 RX ADMIN — SODIUM CHLORIDE 2 GRAM(S): 9 INJECTION INTRAMUSCULAR; INTRAVENOUS; SUBCUTANEOUS at 05:56

## 2024-03-05 RX ADMIN — PIPERACILLIN AND TAZOBACTAM 25 GRAM(S): 4; .5 INJECTION, POWDER, LYOPHILIZED, FOR SOLUTION INTRAVENOUS at 14:34

## 2024-03-05 RX ADMIN — TAMSULOSIN HYDROCHLORIDE 0.4 MILLIGRAM(S): 0.4 CAPSULE ORAL at 21:40

## 2024-03-05 RX ADMIN — Medication 2 PUFF(S): at 20:47

## 2024-03-05 RX ADMIN — CAPTOPRIL 25 MILLIGRAM(S): 12.5 TABLET ORAL at 06:32

## 2024-03-05 RX ADMIN — PIPERACILLIN AND TAZOBACTAM 25 GRAM(S): 4; .5 INJECTION, POWDER, LYOPHILIZED, FOR SOLUTION INTRAVENOUS at 05:53

## 2024-03-05 RX ADMIN — POLYETHYLENE GLYCOL 3350 17 GRAM(S): 17 POWDER, FOR SOLUTION ORAL at 18:18

## 2024-03-05 RX ADMIN — CAPTOPRIL 25 MILLIGRAM(S): 12.5 TABLET ORAL at 12:05

## 2024-03-05 RX ADMIN — PIPERACILLIN AND TAZOBACTAM 25 GRAM(S): 4; .5 INJECTION, POWDER, LYOPHILIZED, FOR SOLUTION INTRAVENOUS at 21:40

## 2024-03-05 RX ADMIN — ATORVASTATIN CALCIUM 20 MILLIGRAM(S): 80 TABLET, FILM COATED ORAL at 21:40

## 2024-03-05 RX ADMIN — POLYETHYLENE GLYCOL 3350 17 GRAM(S): 17 POWDER, FOR SOLUTION ORAL at 05:56

## 2024-03-05 RX ADMIN — CAPTOPRIL 25 MILLIGRAM(S): 12.5 TABLET ORAL at 18:17

## 2024-03-05 RX ADMIN — Medication 10 MILLIGRAM(S): at 12:05

## 2024-03-05 RX ADMIN — CHLORHEXIDINE GLUCONATE 1 APPLICATION(S): 213 SOLUTION TOPICAL at 12:06

## 2024-03-05 RX ADMIN — LATANOPROST 1 DROP(S): 0.05 SOLUTION/ DROPS OPHTHALMIC; TOPICAL at 21:40

## 2024-03-05 RX ADMIN — Medication 2 PUFF(S): at 09:28

## 2024-03-05 RX ADMIN — SENNA PLUS 1 TABLET(S): 8.6 TABLET ORAL at 12:05

## 2024-03-05 RX ADMIN — SODIUM CHLORIDE 3 GRAM(S): 9 INJECTION INTRAMUSCULAR; INTRAVENOUS; SUBCUTANEOUS at 18:18

## 2024-03-05 RX ADMIN — ENOXAPARIN SODIUM 40 MILLIGRAM(S): 100 INJECTION SUBCUTANEOUS at 12:05

## 2024-03-05 NOTE — DISCHARGE NOTE PROVIDER - NSDCCPCAREPLAN_GEN_ALL_CORE_FT
PRINCIPAL DISCHARGE DIAGNOSIS  Diagnosis: Hyponatremia  Assessment and Plan of Treatment:       SECONDARY DISCHARGE DIAGNOSES  Diagnosis: Swelling of inguinal region  Assessment and Plan of Treatment:

## 2024-03-05 NOTE — DISCHARGE NOTE PROVIDER - CARE PROVIDER_API CALL
Estuardo Bain  Surgery  [address] [facility] [address]  [place] [address]  Phone: [phone]  Fax: [phone]  Follow Up Time: 2 weeks

## 2024-03-05 NOTE — DISCHARGE NOTE PROVIDER - NSDCMRMEDTOKEN_GEN_ALL_CORE_FT
captopril 25 mg oral tablet: 1 tab(s) orally 3 times a day  Eliquis 5 mg oral tablet: 1 tab(s) orally 2 times a day  latanoprost 0.005% ophthalmic emulsion: 1 drop(s) in each affected eye once a day  Lipitor 20 mg oral tablet: 1 tab(s) orally once a day  propranolol 80 mg oral capsule, extended release: 1 cap(s) orally once a day  Pulmicort Flexhaler 180 mcg/inh inhalation powder: 1 puff(s) inhaled 2 times a day  tamsulosin 0.4 mg oral capsule: 1 cap(s) orally once a day   captopril 25 mg oral tablet: 1 tab(s) orally 3 times a day  Eliquis 5 mg oral tablet: 1 tab(s) orally 2 times a day  latanoprost 0.005% ophthalmic emulsion: 1 drop(s) in each affected eye once a day  Lipitor 20 mg oral tablet: 1 tab(s) orally once a day  Outpatient Physical Therapy: Outpatient PT  propranolol 80 mg oral capsule, extended release: 1 cap(s) orally once a day  Pulmicort Flexhaler 180 mcg/inh inhalation powder: 1 puff(s) inhaled 2 times a day  tamsulosin 0.4 mg oral capsule: 1 cap(s) orally once a day

## 2024-03-05 NOTE — PROGRESS NOTE ADULT - SUBJECTIVE AND OBJECTIVE BOX
Cardiovascular Disease Progress Note    Overnight events: No acute events overnight.  no new cardiac sx  Otherwise review of systems negative    Objective Findings:  T(C): 35.9 (03-05-24 @ 04:00), Max: 36.2 (03-04-24 @ 12:00)  HR: 81 (03-05-24 @ 04:00) (61 - 81)  BP: 113/48 (03-05-24 @ 04:00) (99/60 - 149/65)  RR: 22 (03-05-24 @ 04:00) (13 - 22)  SpO2: 99% (03-05-24 @ 04:00) (98% - 100%)  Wt(kg): --  Daily     Daily       Physical Exam:  Gen: NAD  HEENT: EOMI  CV: RRR, normal S1 + S2, no m/r/g  Lungs: CTAB  Abd: soft, non-tender  Ext: No edema    Telemetry:    Laboratory Data:                        9.8    17.28 )-----------( 285      ( 05 Mar 2024 01:20 )             26.6     03-05    121<L>  |  87<L>  |  16  ----------------------------<  103<H>  4.6   |  23  |  0.74    Ca    8.2<L>      05 Mar 2024 01:20  Phos  3.3     03-05  Mg     1.90     03-05                Inpatient Medications:  MEDICATIONS  (STANDING):  atorvastatin 20 milliGRAM(s) Oral at bedtime  buDESOnide   90 MICROgram(s) Inhaler 2 Puff(s) Inhalation two times a day  captopril 25 milliGRAM(s) Oral <User Schedule>  chlorhexidine 2% Cloths 1 Application(s) Topical daily  enoxaparin Injectable 40 milliGRAM(s) SubCutaneous every 24 hours  latanoprost 0.005% Ophthalmic Solution 1 Drop(s) Both EYES at bedtime  piperacillin/tazobactam IVPB.. 3.375 Gram(s) IV Intermittent every 8 hours  polyethylene glycol 3350 17 Gram(s) Oral two times a day  propranolol 80 milliGRAM(s) Oral daily  senna 1 Tablet(s) Oral daily  sodium chloride 2 Gram(s) Oral every 12 hours  tamsulosin 0.4 milliGRAM(s) Oral at bedtime      Assessment:  79y Male w pmhx of Afib on Eliquis s/p recent LIHR  and 2 recent falls w extensive bruising and associated groin and scrotal hematoma. Incidentally, labs found pt to be hyponatremic    Recs:  cardiac stable  care per sicu  vol status stable. fluid restriction per renal  AC on hold for persistent afib, resume when safe. to consider watchman evaluation as outpatient to allow for safe discontinuation of AC. undergoing CTA of left atrial appendage  tele monitoring  hyponatremia, tx per renal, recs appreciated. slowly improving  transfuse prn to hg > 7  dvt ppx as able        Over 25 minutes spent on total encounter; more than 50% of the visit was spent counseling and/or coordinating care by the attending physician.      Steve Gu MD   Cardiovascular Disease  [phone]

## 2024-03-05 NOTE — PROGRESS NOTE ADULT - ASSESSMENT
79y Male w pmhx of Afib on Eliquis s/p recent left inguinal hernia repair presents with impressive groin and scrotal hematoma after multiple falls at home postoperatively. Patient incidentally found to be hyponatremic 106s. 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 Patient incidentally found to be hyponatremic 106s. 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

## 2024-03-05 NOTE — PROGRESS NOTE ADULT - SUBJECTIVE AND OBJECTIVE BOX
Surgery Progress Note    OVERNIGHT EVENTS: NAEO. Patient is listed for the floor    SUBJECTIVE: Pt seen and examined at bedside. Patient comfortable and in no-apparent distress. Pain is controlled. Patient tolerating a LRD diet without N/v. denies feeling confused.     Vital Signs Last 24 Hrs  T(C): 36.2 (05 Mar 2024 00:00), Max: 36.2 (04 Mar 2024 12:00)  T(F): 97.1 (05 Mar 2024 00:00), Max: 97.1 (04 Mar 2024 12:00)  HR: 69 (05 Mar 2024 00:00) (61 - 74)  BP: 108/57 (05 Mar 2024 00:00) (99/60 - 149/65)  BP(mean): 72 (05 Mar 2024 00:00) (70 - 88)  RR: 17 (05 Mar 2024 00:00) (13 - 20)  SpO2: 100% (05 Mar 2024 00:00) (98% - 100%)    Parameters below as of 04 Mar 2024 22:15  Patient On (Oxygen Delivery Method): room air        PHYSICAL EXAM:  General Appearance: Appears well, NAD  Respiratory: No labored breathing  CV: Pulse regularly present  Abdomen: Soft, nondistended, NTTP. No rebound or guarding. Lower abd with ecchymoses and bruising. Suprapubic area, left testicle and groin with ecchymoses and indurated, improving compared to previous exams     INs and OUTs:    03-03-24 @ 07:01  -  03-04-24 @ 07:00  --------------------------------------------------------  IN: 330 mL / OUT: 3125 mL / NET: -2795 mL    03-04-24 @ 07:01  -  03-05-24 @ 05:49  --------------------------------------------------------  IN: 900 mL / OUT: 1200 mL / NET: -300 mL        LABS:                        9.8    17.28 )-----------( 285      ( 05 Mar 2024 01:20 )             26.6     03-05    121<L>  |  87<L>  |  16  ----------------------------<  103<H>  4.6   |  23  |  0.74    Ca    8.2<L>      05 Mar 2024 01:20  Phos  3.3     03-05  Mg     1.90     03-05        Urinalysis Basic - ( 05 Mar 2024 01:20 )    Color: x / Appearance: x / SG: x / pH: x  Gluc: 103 mg/dL / Ketone: x  / Bili: x / Urobili: x   Blood: x / Protein: x / Nitrite: x   Leuk Esterase: x / RBC: x / WBC x   Sq Epi: x / Non Sq Epi: x / Bacteria: x

## 2024-03-05 NOTE — DISCHARGE NOTE PROVIDER - DISCHARGE DATE
· HGB 7 8; appears to be chronic issue with baseline around 8-9  · Small amount of bloody urine in archibald bag   · Further trend as noted below   · Would hold off on transfusion unless Hgb<7  · Monitor closely given supratherapeutic INR    Lab Results   Component Value Date    HGB 7 5 (L) 02/06/2022    HGB 7 7 (L) 02/05/2022    HGB 7 8 (L) 02/04/2022 10-Mar-2024

## 2024-03-05 NOTE — PROGRESS NOTE ADULT - SUBJECTIVE AND OBJECTIVE BOX
[facility]   MD DON WHITE MD MARIA SANTIAGO, NP    TEL:  OFFICE: [phone]  From 5pm-7am Answering Service [phone]    -- RENAL FOLLOW UP NOTE ---Date of Service 03-05-24 @ 15:49    Patient is a 79y old  Male who presents with a chief complaint of Hyponatremia, hypo-osmolarity, or hypo-osmolar hyponatremia     (04 Mar 2024 16:54)      Patient seen and examined at bedside. No chest pain/sob    VITALS:  T(F): 97.2 (03-05-24 @ 12:00), Max: 97.2 (03-05-24 @ 12:00)  HR: 76 (03-05-24 @ 12:00)  BP: 115/45 (03-05-24 @ 12:00)  RR: 19 (03-05-24 @ 12:00)  SpO2: 100% (03-05-24 @ 12:00)  Wt(kg): --    03-04 @ 07:01  -  03-05 @ 07:00  --------------------------------------------------------  IN: 1000 mL / OUT: 2525 mL / NET: -1525 mL    03-05 @ 07:01  -  03-05 @ 15:49  --------------------------------------------------------  IN: 500 mL / OUT: 600 mL / NET: -100 mL          PHYSICAL EXAM:  General: NAD  Neck: No JVD  Respiratory: CTAB, no wheezes, rales or rhonchi  Cardiovascular: S1, S2, RRR  Gastrointestinal: BS+, soft, NT/ND  Extremities: +1 LE edema     Hospital Medications:   MEDICATIONS  (STANDING):  atorvastatin 20 milliGRAM(s) Oral at bedtime  buDESOnide   90 MICROgram(s) Inhaler 2 Puff(s) Inhalation two times a day  captopril 25 milliGRAM(s) Oral <User Schedule>  chlorhexidine 2% Cloths 1 Application(s) Topical daily  enoxaparin Injectable 40 milliGRAM(s) SubCutaneous every 24 hours  latanoprost 0.005% Ophthalmic Solution 1 Drop(s) Both EYES at bedtime  piperacillin/tazobactam IVPB.. 3.375 Gram(s) IV Intermittent every 8 hours  polyethylene glycol 3350 17 Gram(s) Oral two times a day  propranolol 80 milliGRAM(s) Oral daily  senna 1 Tablet(s) Oral daily  sodium chloride 3 Gram(s) Oral every 12 hours  tamsulosin 0.4 milliGRAM(s) Oral at bedtime      LABS:  03-05    122<L>  |  89<L>  |  14  ----------------------------<  120<H>  4.3   |  26  |  0.77    Ca    8.2<L>      05 Mar 2024 12:20  Phos  3.2     03-05  Mg     1.90     03-05      Creatinine Trend: 0.77 <--, 0.74 <--, 0.76 <--, 0.70 <--, 0.70 <--, 0.76 <--, 0.74 <--, 0.67 <--, 0.71 <--, 0.76 <--, 0.88 <--, 0.67 <--, 0.74 <--, 0.70 <--, 0.61 <--, 0.58 <--, 0.59 <--, 0.61 <--    Phosphorus: 3.2 mg/dL (03-05 @ 12:20)  Phosphorus: 3.3 mg/dL (03-05 @ 01:20)                              9.8    17.28 )-----------( 285      ( 05 Mar 2024 01:20 )             26.6     Urine Studies:  Urinalysis - [03-05-24 @ 12:20]      Color  / Appearance  / SG  / pH       Gluc 120 / Ketone   / Bili  / Urobili        Blood  / Protein  / Leuk Est  / Nitrite       RBC  / WBC  / Hyaline  / Gran  / Sq Epi  / Non Sq Epi  / Bacteria     Urine Creatinine 99      [03-01-24 @ 02:01]  Urine Protein 25      [03-01-24 @ 02:01]  Urine Sodium 41      [03-01-24 @ 02:01]  Urine Urea Nitrogen 990.0      [03-01-24 @ 02:01]  Urine Potassium 66.6      [03-01-24 @ 02:01]  Urine Osmolality 596      [03-01-24 @ 02:01]    TSH 2.38      [03-04-24 @ 02:10]        RADIOLOGY & ADDITIONAL STUDIES:

## 2024-03-05 NOTE — PROGRESS NOTE ADULT - ASSESSMENT
79M w/ PMH of Afib on Eliquis, s/p recent left inguinal hernia repair 7 days ago. 2 recent falls without headstrike, one on tuesday, one earlier today, was sent to ER by surgeon following seeing extensive bruising from waist to bilateral thighs.  Passing gas, denies groin pain, does have some swelling right groin, normal bowel function with soft brown BM today. No HS with fall, fell onto buttocks both times.  Pt reports mild lightheadedness and weakness of legs.  Nephrology consulted for hyponatremia.    A/P:  Hyponatremia:  Na remains low but improving at acceptable rate.  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 He is euvolemic and asymptomatic.  Pt reports he has been drinking more than 8 glasses of water daily for years.  Recent decreased solid intake d/t hernia repair.  His Urine /Plasma Electrolyte ratio was 1.000. Hence he may need help other than fluid restriction for correction.  Na remains low   Start tolvaptan 15 mg once   D/C fluid restriction   D/C Na tabs   Goal of Na in next 24 hrs is between 127-129.  Check BMP this evening     HTN:  Fluctuating.  c/w BP meds  Management per ICU.  Monitor BP.    Anemia:  Workup and management per ICU.  Transfuse for Hgb <8.  Monitor Hgb.    Hypocalcemia:  Consider checking Vit. D level.  Albumin level is low   Replete per ICU.  Monitor Ca.    Hypophosphatemia:  Likely sec to poor intake of solids.  Repleted by ICU w/ Kphos 15mmol 3/3  Replete as needed.  Monitor    Proteinuria:  Trace on UA.  UPr/Cr 0.2gm.  Monitor.       79M w/ PMH of Afib on Eliquis, s/p recent left inguinal hernia repair 7 days ago. 2 recent falls without headstrike, one on tuesday, one earlier today, was sent to ER by surgeon following seeing extensive bruising from waist to bilateral thighs.  Passing gas, denies groin pain, does have some swelling right groin, normal bowel function with soft brown BM today. No HS with fall, fell onto buttocks both times.  Pt reports mild lightheadedness and weakness of legs.  Nephrology consulted for hyponatremia.    A/P:  Hyponatremia:  Na remains low but improving at acceptable rate.  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 He is euvolemic and asymptomatic.  Pt reports he has been drinking more than 8 glasses of water daily for years.  Recent decreased solid intake d/t hernia repair.  His Urine /Plasma Electrolyte ratio was 1.000. Hence he may need help other than fluid restriction for correction.  Na remains low   Start tolvaptan 15 mg one dose   D/C fluid restriction   D/C Na tabs   Repeat Na level at 4PM  Goal of Na in next 24 hrs is between 128-130.  Check BMP this evening     HTN:  Fluctuating.  c/w BP meds  Management per ICU.  Monitor BP.    Anemia:  Workup and management per ICU.  Transfuse for Hgb <8.  Monitor Hgb.    Hypocalcemia:  Consider checking Vit. D level.  Albumin level is low   Replete per ICU.  Monitor Ca.    Hypophosphatemia:  Likely sec to poor intake of solids.  Repleted by ICU w/ Kphos 15mmol 3/3  Replete as needed.  Monitor    Proteinuria:  Trace on UA.  UPr/Cr 0.2gm.  Monitor.

## 2024-03-05 NOTE — DISCHARGE NOTE PROVIDER - HOSPITAL COURSE
79M afib on Eliquis recent left inguinal hernia repair 7 days ago. 2 recent falls without headstrike, one on tuesday, one earlier today, was sent to ER by surgeon following seeing extensive bruising from waist to bilateral thighs. CT scan in the ED: Large hematoma fills and expands the left inguinal hernia operative bed. No active extravasation of contrast is evident. 3.3 cm in greatest dimension mildly complex cystic mass in the uncinate process of the pancreas. If not contraindicated and if not previously worked up, recommend nonemergent follow-up pancreatic protocol MRI with and without gadolinium.   Labs significant for sodium of 106 and t bili elevated to 8.6. SICU, nephrology consulted for management of hyponatremia.   3/2- Cardiology consulted. PT evaluated and recommended CATHLEEN.  3/3- Patient listed for the floor from SICU. Sodium corrected to 120.  **pending    At the time of discharge, the patient was hemodynamically stable, was tolerating PO diet, was voiding urine and passing stool.  Patient was ambulating and was comfortable with adequate pain control.  The patient was instructed to follow up with Dr. Bain within 2 weeks after discharge from the hospital.  The patient / family felt comfortable with discharge.  The patient had no other issues.    Per attending, patient deemed medically stable and ready for discharge at this time.   79M afib on Eliquis recent left inguinal hernia repair 7 days ago. 2 recent falls without headstrike, one on tuesday, one earlier today, was sent to ER by surgeon following seeing extensive bruising from waist to bilateral thighs. CT scan in the ED: Large hematoma fills and expands the left inguinal hernia operative bed. No active extravasation of contrast is evident. 3.3 cm in greatest dimension mildly complex cystic mass in the uncinate process of the pancreas. If not contraindicated and if not previously worked up, recommend nonemergent follow-up pancreatic protocol MRI with and without gadolinium.   Labs significant for sodium of 106 and t bili elevated to 8.6. SICU, nephrology consulted for management of hyponatremia.   3/2- Cardiology consulted. PT evaluated and recommended CATHLEEN.  3/3- Patient listed for the floor from SICU. Sodium corrected to 120.  3/4-Sodium continued to improve, mild hyperkalemia at 5.6 treated with insulin, while ekg was stable  3/5-Increased NaCL to 3g twice daily per nephrology recommendations, groin hematoma continued to resolve  3/6-Tolvaptan 15 mg added, and NaCl tablets discontinued    At the time of discharge, the patient was hemodynamically stable, was tolerating PO diet, was voiding urine and passing stool.  Patient was ambulating and was comfortable with adequate pain control.  The patient was instructed to follow up with Dr. Bain within 2 weeks after discharge from the hospital.  The patient / family felt comfortable with discharge.  The patient had no other issues.    Per attending, patient deemed medically stable and ready for discharge at this time.   79M afib on Eliquis recent left inguinal hernia repair 7 days ago. 2 recent falls without headstrike, one on tuesday, one earlier today, was sent to ER by surgeon following seeing extensive bruising from waist to bilateral thighs. CT scan in the ED: Large hematoma fills and expands the left inguinal hernia operative bed. No active extravasation of contrast is evident. 3.3 cm in greatest dimension mildly complex cystic mass in the uncinate process of the pancreas. If not contraindicated and if not previously worked up, recommend nonemergent follow-up pancreatic protocol MRI with and without gadolinium.   Labs significant for sodium of 106 and t bili elevated to 8.6. SICU, nephrology consulted for management of hyponatremia.   3/2- Cardiology consulted. PT evaluated and recommended CATHLEEN.  3/3- Patient listed for the floor from SICU. Sodium corrected to 120.  3/4-Sodium continued to improve, mild hyperkalemia at 5.6 treated with insulin, while ekg was stable  3/5-Increased NaCL to 3g twice daily per nephrology recommendations, groin hematoma continued to resolve  3/6-Tolvaptan 15 mg added, and NaCl tablets discontinued  3/7-Na uptrending 118->124->126, no additional sodium tablets added  3/8-Patient downgraded to the surgical floor, where yung catheter was removed and eliquis was restarted. He continued to recover appropriately  3/9-Patient continued to tolerate diet, ambulate minimally, eager to go home, refusing direct discharge to rehab  3/10- Patient continues to recover well on the floor, no concerns reported, eager to go home    At the time of discharge, the patient was hemodynamically stable, was tolerating PO diet, was voiding urine and passing stool.  Patient was ambulating and was comfortable with adequate pain control.  The patient was instructed to follow up with Dr. Bain within 2 weeks after discharge from the hospital.  The patient / family felt comfortable with discharge.  The patient had no other issues.    Per attending, patient deemed medically stable and ready for discharge at this time.

## 2024-03-05 NOTE — PROGRESS NOTE ADULT - ASSESSMENT
Assessment: 79 year old man with afib on eliquis and recent LIHR 2/22/24 by Dr. Bain, now with falls x2 after resuming Eliquis Hyponatremia to 106 on BMP at admission, patient admitted to SICU for management. Patient is listed for floor.     Plan:  - Holding Eliquis  - Diet: regular  - Miralax  - Appreciate nephro recs  - Dispo- pending transfer to floor    A Team Surgery   [id number]

## 2024-03-06 LAB
ANION GAP SERPL CALC-SCNC: 10 MMOL/L — SIGNIFICANT CHANGE UP (ref 7–14)
ANION GAP SERPL CALC-SCNC: 9 MMOL/L — SIGNIFICANT CHANGE UP (ref 7–14)
ANION GAP SERPL CALC-SCNC: 9 MMOL/L — SIGNIFICANT CHANGE UP (ref 7–14)
BUN SERPL-MCNC: 13 MG/DL — SIGNIFICANT CHANGE UP (ref 7–23)
BUN SERPL-MCNC: 16 MG/DL — SIGNIFICANT CHANGE UP (ref 7–23)
BUN SERPL-MCNC: 17 MG/DL — SIGNIFICANT CHANGE UP (ref 7–23)
CALCIUM SERPL-MCNC: 7.9 MG/DL — LOW (ref 8.4–10.5)
CALCIUM SERPL-MCNC: 8.1 MG/DL — LOW (ref 8.4–10.5)
CALCIUM SERPL-MCNC: 8.2 MG/DL — LOW (ref 8.4–10.5)
CHLORIDE SERPL-SCNC: 86 MMOL/L — LOW (ref 98–107)
CHLORIDE SERPL-SCNC: 90 MMOL/L — LOW (ref 98–107)
CHLORIDE SERPL-SCNC: 93 MMOL/L — LOW (ref 98–107)
CO2 SERPL-SCNC: 22 MMOL/L — SIGNIFICANT CHANGE UP (ref 22–31)
CO2 SERPL-SCNC: 24 MMOL/L — SIGNIFICANT CHANGE UP (ref 22–31)
CO2 SERPL-SCNC: 25 MMOL/L — SIGNIFICANT CHANGE UP (ref 22–31)
CREAT SERPL-MCNC: 0.77 MG/DL — SIGNIFICANT CHANGE UP (ref 0.5–1.3)
CREAT SERPL-MCNC: 0.91 MG/DL — SIGNIFICANT CHANGE UP (ref 0.5–1.3)
CREAT SERPL-MCNC: 0.97 MG/DL — SIGNIFICANT CHANGE UP (ref 0.5–1.3)
EGFR: 79 ML/MIN/1.73M2 — SIGNIFICANT CHANGE UP
EGFR: 86 ML/MIN/1.73M2 — SIGNIFICANT CHANGE UP
EGFR: 91 ML/MIN/1.73M2 — SIGNIFICANT CHANGE UP
GLUCOSE SERPL-MCNC: 106 MG/DL — HIGH (ref 70–99)
GLUCOSE SERPL-MCNC: 110 MG/DL — HIGH (ref 70–99)
GLUCOSE SERPL-MCNC: 124 MG/DL — HIGH (ref 70–99)
HCT VFR BLD CALC: 25.6 % — LOW (ref 39–50)
HCT VFR BLD CALC: 27.7 % — LOW (ref 39–50)
HGB BLD-MCNC: 9.2 G/DL — LOW (ref 13–17)
HGB BLD-MCNC: 9.7 G/DL — LOW (ref 13–17)
MAGNESIUM SERPL-MCNC: 1.8 MG/DL — SIGNIFICANT CHANGE UP (ref 1.6–2.6)
MAGNESIUM SERPL-MCNC: 1.9 MG/DL — SIGNIFICANT CHANGE UP (ref 1.6–2.6)
MAGNESIUM SERPL-MCNC: 2 MG/DL — SIGNIFICANT CHANGE UP (ref 1.6–2.6)
MCHC RBC-ENTMCNC: 31.1 PG — SIGNIFICANT CHANGE UP (ref 27–34)
MCHC RBC-ENTMCNC: 31.2 PG — SIGNIFICANT CHANGE UP (ref 27–34)
MCHC RBC-ENTMCNC: 35 GM/DL — SIGNIFICANT CHANGE UP (ref 32–36)
MCHC RBC-ENTMCNC: 35.9 GM/DL — SIGNIFICANT CHANGE UP (ref 32–36)
MCV RBC AUTO: 86.5 FL — SIGNIFICANT CHANGE UP (ref 80–100)
MCV RBC AUTO: 89.1 FL — SIGNIFICANT CHANGE UP (ref 80–100)
NRBC # BLD: 0 /100 WBCS — SIGNIFICANT CHANGE UP (ref 0–0)
NRBC # BLD: 0 /100 WBCS — SIGNIFICANT CHANGE UP (ref 0–0)
NRBC # FLD: 0 K/UL — SIGNIFICANT CHANGE UP (ref 0–0)
NRBC # FLD: 0 K/UL — SIGNIFICANT CHANGE UP (ref 0–0)
PHOSPHATE SERPL-MCNC: 2.9 MG/DL — SIGNIFICANT CHANGE UP (ref 2.5–4.5)
PHOSPHATE SERPL-MCNC: 3 MG/DL — SIGNIFICANT CHANGE UP (ref 2.5–4.5)
PHOSPHATE SERPL-MCNC: 3.2 MG/DL — SIGNIFICANT CHANGE UP (ref 2.5–4.5)
PLATELET # BLD AUTO: 241 K/UL — SIGNIFICANT CHANGE UP (ref 150–400)
PLATELET # BLD AUTO: 254 K/UL — SIGNIFICANT CHANGE UP (ref 150–400)
POTASSIUM SERPL-MCNC: 4.6 MMOL/L — SIGNIFICANT CHANGE UP (ref 3.5–5.3)
POTASSIUM SERPL-MCNC: 4.7 MMOL/L — SIGNIFICANT CHANGE UP (ref 3.5–5.3)
POTASSIUM SERPL-MCNC: 5 MMOL/L — SIGNIFICANT CHANGE UP (ref 3.5–5.3)
POTASSIUM SERPL-SCNC: 4.6 MMOL/L — SIGNIFICANT CHANGE UP (ref 3.5–5.3)
POTASSIUM SERPL-SCNC: 4.7 MMOL/L — SIGNIFICANT CHANGE UP (ref 3.5–5.3)
POTASSIUM SERPL-SCNC: 5 MMOL/L — SIGNIFICANT CHANGE UP (ref 3.5–5.3)
RBC # BLD: 2.96 M/UL — LOW (ref 4.2–5.8)
RBC # BLD: 3.11 M/UL — LOW (ref 4.2–5.8)
RBC # FLD: 15.1 % — HIGH (ref 10.3–14.5)
RBC # FLD: 15.3 % — HIGH (ref 10.3–14.5)
SODIUM SERPL-SCNC: 118 MMOL/L — CRITICAL LOW (ref 135–145)
SODIUM SERPL-SCNC: 124 MMOL/L — LOW (ref 135–145)
SODIUM SERPL-SCNC: 126 MMOL/L — LOW (ref 135–145)
WBC # BLD: 11.12 K/UL — HIGH (ref 3.8–10.5)
WBC # BLD: 12.16 K/UL — HIGH (ref 3.8–10.5)
WBC # FLD AUTO: 11.12 K/UL — HIGH (ref 3.8–10.5)
WBC # FLD AUTO: 12.16 K/UL — HIGH (ref 3.8–10.5)

## 2024-03-06 PROCEDURE — 99231 SBSQ HOSP IP/OBS SF/LOW 25: CPT | Mod: GC

## 2024-03-06 RX ORDER — TOLVAPTAN 15 MG/1
15 TABLET ORAL ONCE
Refills: 0 | Status: COMPLETED | OUTPATIENT
Start: 2024-03-06 | End: 2024-03-06

## 2024-03-06 RX ADMIN — CAPTOPRIL 25 MILLIGRAM(S): 12.5 TABLET ORAL at 17:21

## 2024-03-06 RX ADMIN — CHLORHEXIDINE GLUCONATE 1 APPLICATION(S): 213 SOLUTION TOPICAL at 11:27

## 2024-03-06 RX ADMIN — TAMSULOSIN HYDROCHLORIDE 0.4 MILLIGRAM(S): 0.4 CAPSULE ORAL at 21:49

## 2024-03-06 RX ADMIN — TOLVAPTAN 15 MILLIGRAM(S): 15 TABLET ORAL at 03:26

## 2024-03-06 RX ADMIN — CAPTOPRIL 25 MILLIGRAM(S): 12.5 TABLET ORAL at 07:00

## 2024-03-06 RX ADMIN — CAPTOPRIL 25 MILLIGRAM(S): 12.5 TABLET ORAL at 11:26

## 2024-03-06 RX ADMIN — SENNA PLUS 1 TABLET(S): 8.6 TABLET ORAL at 11:27

## 2024-03-06 RX ADMIN — ENOXAPARIN SODIUM 40 MILLIGRAM(S): 100 INJECTION SUBCUTANEOUS at 12:20

## 2024-03-06 RX ADMIN — Medication 2 PUFF(S): at 19:49

## 2024-03-06 RX ADMIN — PIPERACILLIN AND TAZOBACTAM 25 GRAM(S): 4; .5 INJECTION, POWDER, LYOPHILIZED, FOR SOLUTION INTRAVENOUS at 21:48

## 2024-03-06 RX ADMIN — PIPERACILLIN AND TAZOBACTAM 25 GRAM(S): 4; .5 INJECTION, POWDER, LYOPHILIZED, FOR SOLUTION INTRAVENOUS at 13:54

## 2024-03-06 RX ADMIN — LATANOPROST 1 DROP(S): 0.05 SOLUTION/ DROPS OPHTHALMIC; TOPICAL at 21:49

## 2024-03-06 RX ADMIN — PIPERACILLIN AND TAZOBACTAM 25 GRAM(S): 4; .5 INJECTION, POWDER, LYOPHILIZED, FOR SOLUTION INTRAVENOUS at 06:25

## 2024-03-06 RX ADMIN — ATORVASTATIN CALCIUM 20 MILLIGRAM(S): 80 TABLET, FILM COATED ORAL at 21:49

## 2024-03-06 RX ADMIN — POLYETHYLENE GLYCOL 3350 17 GRAM(S): 17 POWDER, FOR SOLUTION ORAL at 05:13

## 2024-03-06 RX ADMIN — POLYETHYLENE GLYCOL 3350 17 GRAM(S): 17 POWDER, FOR SOLUTION ORAL at 17:21

## 2024-03-06 NOTE — PROGRESS NOTE ADULT - ASSESSMENT
Assessment: 79 year old man with afib on eliquis and recent LIHR 2/22/24 by Dr. Bain, now with falls x2 after resuming Eliquis Hyponatremia to 106 on BMP at admission, patient admitted to SICU for management. Patient is listed for floor.     Plan:  - Holding Eliquis  - Diet: regular  - Miralax  - Appreciate nephro recs: "Na remains low   Start tolvaptan 15 mg one dose   D/C fluid restriction   D/C Na tabs   Repeat Na level at 4PM  Goal of Na in next 24 hrs is between 128-130.  Check BMP this evening "  - Dispo- pending transfer to floor    A Team Surgery   [id number]

## 2024-03-06 NOTE — PROGRESS NOTE ADULT - SUBJECTIVE AND OBJECTIVE BOX
SICU Daily Progress Note  =====================================================  Interval/Overnight Events:       - no acute overnight events    ALLERGIES:  tetracycline (Hives; Rash)      --------------------------------------------------------------------------------------    MEDICATIONS:    Neurologic Medications    Respiratory Medications  buDESOnide   90 MICROgram(s) Inhaler 2 Puff(s) Inhalation two times a day    Cardiovascular Medications  captopril 25 milliGRAM(s) Oral <User Schedule>  propranolol 80 milliGRAM(s) Oral daily    Gastrointestinal Medications  bisacodyl Suppository 10 milliGRAM(s) Rectal daily PRN Constipation  polyethylene glycol 3350 17 Gram(s) Oral two times a day  senna 1 Tablet(s) Oral daily  sodium chloride 3 Gram(s) Oral every 12 hours    Genitourinary Medications  tamsulosin 0.4 milliGRAM(s) Oral at bedtime    Hematologic/Oncologic Medications  enoxaparin Injectable 40 milliGRAM(s) SubCutaneous every 24 hours    Antimicrobial/Immunologic Medications  piperacillin/tazobactam IVPB.. 3.375 Gram(s) IV Intermittent every 8 hours    Endocrine/Metabolic Medications  atorvastatin 20 milliGRAM(s) Oral at bedtime    Topical/Other Medications  chlorhexidine 2% Cloths 1 Application(s) Topical daily  latanoprost 0.005% Ophthalmic Solution 1 Drop(s) Both EYES at bedtime    --------------------------------------------------------------------------------------    VITAL SIGNS:  T(C): 36.6 (03-06-24 @ 00:00), Max: 36.8 (03-05-24 @ 20:00)  HR: 89 (03-06-24 @ 00:00) (71 - 89)  BP: 129/46 (03-06-24 @ 00:00) (112/50 - 137/80)  RR: 18 (03-06-24 @ 00:00) (16 - 22)  SpO2: 99% (03-06-24 @ 00:00) (97% - 100%)  --------------------------------------------------------------------------------------    INS AND OUTS:    03-04-24 @ 07:01  -  03-05-24 @ 07:00  --------------------------------------------------------  IN: 1000 mL / OUT: 2525 mL / NET: -1525 mL    03-05-24 @ 07:01  -  03-06-24 @ 00:53  --------------------------------------------------------  IN: 1000 mL / OUT: 1500 mL / NET: -500 mL      --------------------------------------------------------------------------------------    EXAM    NEURO: NAD,   HEENT: NC/AT  RESPIRATORY: nonlabored respirations, normal CW expansion  CARDIO: RRR, S1S2  ABDOMEN: soft, nontender, nondistended, groin and scrotal hematoma  EXTREMITIES: normal strength, no deformities    --------------------------------------------------------------------------------------    LABS                        9.8    17.28 )-----------( 285      ( 05 Mar 2024 01:20 )             26.6   03-05    122<L>  |  89<L>  |  14  ----------------------------<  120<H>  4.3   |  26  |  0.77    Ca    8.2<L>      05 Mar 2024 12:20  Phos  3.2     03-05  Mg     1.90     03-05        -------------------------------------------------------------------------------------- SICU Daily Progress Note  =====================================================  Interval/Overnight Events:       - Per nephrology, tolvaptan 15 mg x 1, dc salt tabs/fluid restriction    ALLERGIES:  tetracycline (Hives; Rash)      --------------------------------------------------------------------------------------    MEDICATIONS:    Neurologic Medications    Respiratory Medications  buDESOnide   90 MICROgram(s) Inhaler 2 Puff(s) Inhalation two times a day    Cardiovascular Medications  captopril 25 milliGRAM(s) Oral <User Schedule>  propranolol 80 milliGRAM(s) Oral daily    Gastrointestinal Medications  bisacodyl Suppository 10 milliGRAM(s) Rectal daily PRN Constipation  polyethylene glycol 3350 17 Gram(s) Oral two times a day  senna 1 Tablet(s) Oral daily  sodium chloride 3 Gram(s) Oral every 12 hours    Genitourinary Medications  tamsulosin 0.4 milliGRAM(s) Oral at bedtime    Hematologic/Oncologic Medications  enoxaparin Injectable 40 milliGRAM(s) SubCutaneous every 24 hours    Antimicrobial/Immunologic Medications  piperacillin/tazobactam IVPB.. 3.375 Gram(s) IV Intermittent every 8 hours    Endocrine/Metabolic Medications  atorvastatin 20 milliGRAM(s) Oral at bedtime    Topical/Other Medications  chlorhexidine 2% Cloths 1 Application(s) Topical daily  latanoprost 0.005% Ophthalmic Solution 1 Drop(s) Both EYES at bedtime    --------------------------------------------------------------------------------------    VITAL SIGNS:  T(C): 36.6 (03-06-24 @ 00:00), Max: 36.8 (03-05-24 @ 20:00)  HR: 89 (03-06-24 @ 00:00) (71 - 89)  BP: 129/46 (03-06-24 @ 00:00) (112/50 - 137/80)  RR: 18 (03-06-24 @ 00:00) (16 - 22)  SpO2: 99% (03-06-24 @ 00:00) (97% - 100%)  --------------------------------------------------------------------------------------    INS AND OUTS:    03-04-24 @ 07:01  -  03-05-24 @ 07:00  --------------------------------------------------------  IN: 1000 mL / OUT: 2525 mL / NET: -1525 mL    03-05-24 @ 07:01  -  03-06-24 @ 00:53  --------------------------------------------------------  IN: 1000 mL / OUT: 1500 mL / NET: -500 mL      --------------------------------------------------------------------------------------    EXAM    NEURO: NAD,   HEENT: NC/AT  RESPIRATORY: nonlabored respirations, normal CW expansion  CARDIO: RRR, S1S2  ABDOMEN: soft, nontender, nondistended, groin and scrotal hematoma  EXTREMITIES: normal strength, no deformities    --------------------------------------------------------------------------------------    LABS                        9.8    17.28 )-----------( 285      ( 05 Mar 2024 01:20 )             26.6   03-05    122<L>  |  89<L>  |  14  ----------------------------<  120<H>  4.3   |  26  |  0.77    Ca    8.2<L>      05 Mar 2024 12:20  Phos  3.2     03-05  Mg     1.90     03-05        -------------------------------------------------------------------------------------- SICU Daily Progress Note  =====================================================  Interval/Overnight Events:       - Per nephrology, tolvaptan 15 mg x 1, dc salt tabs/fluid restriction    ALLERGIES:  tetracycline (Hives; Rash)      --------------------------------------------------------------------------------------    MEDICATIONS:    Neurologic Medications    Respiratory Medications  buDESOnide   90 MICROgram(s) Inhaler 2 Puff(s) Inhalation two times a day    Cardiovascular Medications  captopril 25 milliGRAM(s) Oral <User Schedule>  propranolol 80 milliGRAM(s) Oral daily    Gastrointestinal Medications  bisacodyl Suppository 10 milliGRAM(s) Rectal daily PRN Constipation  polyethylene glycol 3350 17 Gram(s) Oral two times a day  senna 1 Tablet(s) Oral daily  sodium chloride 3 Gram(s) Oral every 12 hours    Genitourinary Medications  tamsulosin 0.4 milliGRAM(s) Oral at bedtime    Hematologic/Oncologic Medications  enoxaparin Injectable 40 milliGRAM(s) SubCutaneous every 24 hours    Antimicrobial/Immunologic Medications  piperacillin/tazobactam IVPB.. 3.375 Gram(s) IV Intermittent every 8 hours    Endocrine/Metabolic Medications  atorvastatin 20 milliGRAM(s) Oral at bedtime    Topical/Other Medications  chlorhexidine 2% Cloths 1 Application(s) Topical daily  latanoprost 0.005% Ophthalmic Solution 1 Drop(s) Both EYES at bedtime    --------------------------------------------------------------------------------------    VITAL SIGNS:  T(C): 36.6 (03-06-24 @ 00:00), Max: 36.8 (03-05-24 @ 20:00)  HR: 89 (03-06-24 @ 00:00) (71 - 89)  BP: 129/46 (03-06-24 @ 00:00) (112/50 - 137/80)  RR: 18 (03-06-24 @ 00:00) (16 - 22)  SpO2: 99% (03-06-24 @ 00:00) (97% - 100%)  --------------------------------------------------------------------------------------    INS AND OUTS:    03-04-24 @ 07:01  -  03-05-24 @ 07:00  --------------------------------------------------------  IN: 1000 mL / OUT: 2525 mL / NET: -1525 mL    03-05-24 @ 07:01  -  03-06-24 @ 00:53  --------------------------------------------------------  IN: 1000 mL / OUT: 1500 mL / NET: -500 mL      --------------------------------------------------------------------------------------    EXAM    NEURO: NAD, A&Ox3  HEENT: NC/AT  RESPIRATORY: nonlabored respirations, normal CW expansion  CARDIO: RRR, S1S2  ABDOMEN: soft, nontender, nondistended  EXTREMITIES: normal strength, no deformities    --------------------------------------------------------------------------------------    LABS                        9.8    17.28 )-----------( 285      ( 05 Mar 2024 01:20 )             26.6   03-05    122<L>  |  89<L>  |  14  ----------------------------<  120<H>  4.3   |  26  |  0.77    Ca    8.2<L>      05 Mar 2024 12:20  Phos  3.2     03-05  Mg     1.90     03-05        --------------------------------------------------------------------------------------

## 2024-03-06 NOTE — PROGRESS NOTE ADULT - SUBJECTIVE AND OBJECTIVE BOX
Cardiovascular Disease Progress Note    Overnight events: No acute events overnight. no new cardiac sx   Otherwise review of systems negative    Objective Findings:  T(C): 36.7 (03-06-24 @ 04:00), Max: 36.8 (03-05-24 @ 20:00)  HR: 78 (03-06-24 @ 04:00) (71 - 89)  BP: 126/55 (03-06-24 @ 04:00) (112/50 - 137/80)  RR: 19 (03-06-24 @ 04:00) (16 - 21)  SpO2: 97% (03-06-24 @ 04:00) (97% - 100%)  Wt(kg): --  Daily     Daily       Physical Exam:  Gen: NAD  HEENT: EOMI  CV: RRR, normal S1 + S2, no m/r/g  Lungs: CTAB  Abd: soft, non-tender  Ext: No edema    Telemetry:    Laboratory Data:                        9.2    12.16 )-----------( 241      ( 06 Mar 2024 01:30 )             25.6     03-06    118<LL>  |  86<L>  |  13  ----------------------------<  110<H>  4.6   |  22  |  0.77    Ca    7.9<L>      06 Mar 2024 01:30  Phos  2.9     03-06  Mg     1.80     03-06                Inpatient Medications:  MEDICATIONS  (STANDING):  atorvastatin 20 milliGRAM(s) Oral at bedtime  buDESOnide   90 MICROgram(s) Inhaler 2 Puff(s) Inhalation two times a day  captopril 25 milliGRAM(s) Oral <User Schedule>  chlorhexidine 2% Cloths 1 Application(s) Topical daily  enoxaparin Injectable 40 milliGRAM(s) SubCutaneous every 24 hours  latanoprost 0.005% Ophthalmic Solution 1 Drop(s) Both EYES at bedtime  piperacillin/tazobactam IVPB.. 3.375 Gram(s) IV Intermittent every 8 hours  polyethylene glycol 3350 17 Gram(s) Oral two times a day  propranolol 80 milliGRAM(s) Oral daily  senna 1 Tablet(s) Oral daily  tamsulosin 0.4 milliGRAM(s) Oral at bedtime      Assessment:  79y Male w pmhx of Afib on Eliquis s/p recent LIHR  and 2 recent falls w extensive bruising and associated groin and scrotal hematoma. Incidentally, labs found pt to be hyponatremic    Recs:  cardiac stable  care per sicu  vol status stable. fluid restriction per renal  AC on hold for persistent afib, resume when safe. to consider watchman evaluation as outpatient. eps dr toribio involved  tele monitoring  hyponatremia, tx per renal  transfuse prn to hg > 7  dvt ppx as able        Over 25 minutes spent on total encounter; more than 50% of the visit was spent counseling and/or coordinating care by the attending physician.      Steve Gu MD   Cardiovascular Disease  [phone]

## 2024-03-06 NOTE — PROGRESS NOTE ADULT - ASSESSMENT
79M w/ PMH of Afib on Eliquis, s/p recent left inguinal hernia repair 7 days ago. 2 recent falls without headstrike, one on tuesday, one earlier today, was sent to ER by surgeon following seeing extensive bruising from waist to bilateral thighs.  Passing gas, denies groin pain, does have some swelling right groin, normal bowel function with soft brown BM today. No HS with fall, fell onto buttocks both times.  Pt reports mild lightheadedness and weakness of legs.  Nephrology consulted for hyponatremia.    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 He is euvolemic and asymptomatic.  Pt reports he has been drinking more than 8 glasses of water daily for years.  Recent decreased solid intake d/t hernia repair.  His Urine /Plasma Electrolyte ratio was 1.000. Hence he may need help other than fluid restriction for correction.  Na remains low   s/p  tolvaptan 15 mg this AM  D/C fluid restriction   D/C Na tabs   Repeat Na level at 4PM--and call Dr Altamirano with results    HTN:  Fluctuating.  c/w BP meds  Management per ICU.  Monitor BP.    Anemia:  Workup and management per ICU.  Transfuse for Hgb <8.  Monitor Hgb.    Hypocalcemia:  Consider checking Vit. D level.  Albumin level is low   Replete per ICU.  Monitor Ca.    Hypophosphatemia:  Likely sec to poor intake of solids.  Repleted by ICU w/ Kphos 15mmol 3/3  Replete as needed.  Monitor    Proteinuria:  Trace on UA.  UPr/Cr 0.2gm.  Monitor.

## 2024-03-06 NOTE — PROGRESS NOTE ADULT - SUBJECTIVE AND OBJECTIVE BOX
A Team Surgery Daily Progress Note    Subjective:   Patient seen at bedside this AM. Reports feeling well, without complaints. Pain well controlled on regimen. +2 BM reported. No N/V. No weakness reported. Tolerating diet. No other concerns reported.     24h Events:     - No acute events overnight    Objective:  Vital Signs  T(C): 36.7 (03-06 @ 04:00), Max: 36.8 (03-05 @ 20:00)  HR: 78 (03-06 @ 04:00) (71 - 89)  BP: 126/55 (03-06 @ 04:00) (112/50 - 137/80)  RR: 19 (03-06 @ 04:00) (16 - 21)    SpO2: 97% (03-06 @ 04:00) (97% - 100%)  03-05-24 @ 07:01  -  03-06-24 @ 07:00  --------------------------------------------------------  IN:  Total IN: 0 mL    OUT:    Indwelling Catheter - Urethral (mL): 2100 mL  Total OUT: 2100 mL    Total NET: -2100 mL          Physical Exam:  GEN: resting in bed comfortably in NAD  RESP: no increased WOB  ABD: Soft, nondistended, NTTP. No rebound or guarding. Lower abd with ecchymoses and bruising. Suprapubic area, left testicle and groin with ecchymoses and indurated, improving compared to previous exams   EXTR: warm, well-perfused without gross deformities; spontaneous movement in b/l U/L extrem  NEURO: AAOx4    Labs:                        9.2    12.16 )-----------( 241      ( 06 Mar 2024 01:30 )             25.6   03-06    118<LL>  |  86<L>  |  13  ----------------------------<  110<H>  4.6   |  22  |  0.77    Ca    7.9<L>      06 Mar 2024 01:30  Phos  2.9     03-06  Mg     1.80     03-06      CAPILLARY BLOOD GLUCOSE          Medications:   MEDICATIONS  (STANDING):  atorvastatin 20 milliGRAM(s) Oral at bedtime  buDESOnide   90 MICROgram(s) Inhaler 2 Puff(s) Inhalation two times a day  captopril 25 milliGRAM(s) Oral <User Schedule>  chlorhexidine 2% Cloths 1 Application(s) Topical daily  enoxaparin Injectable 40 milliGRAM(s) SubCutaneous every 24 hours  latanoprost 0.005% Ophthalmic Solution 1 Drop(s) Both EYES at bedtime  piperacillin/tazobactam IVPB.. 3.375 Gram(s) IV Intermittent every 8 hours  polyethylene glycol 3350 17 Gram(s) Oral two times a day  propranolol 80 milliGRAM(s) Oral daily  senna 1 Tablet(s) Oral daily  tamsulosin 0.4 milliGRAM(s) Oral at bedtime    MEDICATIONS  (PRN):  bisacodyl Suppository 10 milliGRAM(s) Rectal daily PRN Constipation      Imaging:

## 2024-03-06 NOTE — PROGRESS NOTE ADULT - SUBJECTIVE AND OBJECTIVE BOX
[facility]   MD DON WHITE MD RUORU WONG, PA    TEL:  FROM 9 AM to 5 PM ---OFFICE: [phone]  AVAILABLE ON TEAMS    FROM 5 PM - 9 AM PLEASE CALL ANSWERING SERVICE: [phone]    RENAL FOLLOW UP NOTE--Date of Service 03-06-24 @ 12:35  --------------------------------------------------------------------------------  HPI:      Pt seen and examined at bedside.       PAST HISTORY  --------------------------------------------------------------------------------  No significant changes to PMH, PSH, FHx, SHx, unless otherwise noted    ALLERGIES & MEDICATIONS  --------------------------------------------------------------------------------  Allergies    tetracycline (Hives; Rash)    Intolerances      Standing Inpatient Medications  atorvastatin 20 milliGRAM(s) Oral at bedtime  buDESOnide   90 MICROgram(s) Inhaler 2 Puff(s) Inhalation two times a day  captopril 25 milliGRAM(s) Oral <User Schedule>  chlorhexidine 2% Cloths 1 Application(s) Topical daily  enoxaparin Injectable 40 milliGRAM(s) SubCutaneous every 24 hours  latanoprost 0.005% Ophthalmic Solution 1 Drop(s) Both EYES at bedtime  piperacillin/tazobactam IVPB.. 3.375 Gram(s) IV Intermittent every 8 hours  polyethylene glycol 3350 17 Gram(s) Oral two times a day  propranolol 80 milliGRAM(s) Oral daily  senna 1 Tablet(s) Oral daily  tamsulosin 0.4 milliGRAM(s) Oral at bedtime    PRN Inpatient Medications  bisacodyl Suppository 10 milliGRAM(s) Rectal daily PRN      REVIEW OF SYSTEMS  --------------------------------------------------------------------------------  General: no fever  MSK: no edema     VITALS/PHYSICAL EXAM  --------------------------------------------------------------------------------  T(C): 36.2 (03-06-24 @ 12:00), Max: 36.8 (03-05-24 @ 20:00)  HR: 87 (03-06-24 @ 12:00) (67 - 89)  BP: 127/58 (03-06-24 @ 12:00) (100/52 - 137/80)  RR: 14 (03-06-24 @ 12:00) (14 - 21)  SpO2: 100% (03-06-24 @ 12:00) (97% - 100%)  Wt(kg): --        03-05-24 @ 07:01  -  03-06-24 @ 07:00  --------------------------------------------------------  IN: 1000 mL / OUT: 2175 mL / NET: -1175 mL    03-06-24 @ 07:01  -  03-06-24 @ 12:35  --------------------------------------------------------  IN: 400 mL / OUT: 525 mL / NET: -125 mL      Physical Exam:  	Gen: NAD  	HEENT: MMM  	Pulm: CTA B/L  	CV: S1S2  	Abd: Soft, +BS  	Ext: No LE edema B/L                      Neuro: Awake   	Skin: Warm and Dry   	Vascular access: NO HD catheter             yung  LABS/STUDIES  --------------------------------------------------------------------------------              9.2    12.16 >-----------<  241      [03-06-24 @ 01:30]              25.6     118  |  86  |  13  ----------------------------<  110      [03-06-24 @ 01:30]  4.6   |  22  |  0.77        Ca     7.9     [03-06-24 @ 01:30]      Mg     1.80     [03-06-24 @ 01:30]      Phos  2.9     [03-06-24 @ 01:30]            Creatinine Trend:  SCr 0.77 [03-06 @ 01:30]  SCr 0.77 [03-05 @ 12:20]  SCr 0.74 [03-05 @ 01:20]  SCr 0.76 [03-04 @ 08:30]  SCr 0.70 [03-04 @ 02:10]    Urinalysis - [03-06-24 @ 01:30]      Color  / Appearance  / SG  / pH       Gluc 110 / Ketone   / Bili  / Urobili        Blood  / Protein  / Leuk Est  / Nitrite       RBC  / WBC  / Hyaline  / Gran  / Sq Epi  / Non Sq Epi  / Bacteria     Urine Creatinine 99      [03-01-24 @ 02:01]  Urine Protein 25      [03-01-24 @ 02:01]  Urine Sodium 41      [03-01-24 @ 02:01]  Urine Urea Nitrogen 990.0      [03-01-24 @ 02:01]  Urine Potassium 66.6      [03-01-24 @ 02:01]  Urine Osmolality 596      [03-01-24 @ 02:01]    TSH 2.38      [03-04-24 @ 02:10]

## 2024-03-07 LAB
ANION GAP SERPL CALC-SCNC: 8 MMOL/L — SIGNIFICANT CHANGE UP (ref 7–14)
BUN SERPL-MCNC: 15 MG/DL — SIGNIFICANT CHANGE UP (ref 7–23)
CALCIUM SERPL-MCNC: 8.1 MG/DL — LOW (ref 8.4–10.5)
CHLORIDE SERPL-SCNC: 96 MMOL/L — LOW (ref 98–107)
CO2 SERPL-SCNC: 25 MMOL/L — SIGNIFICANT CHANGE UP (ref 22–31)
CREAT SERPL-MCNC: 0.89 MG/DL — SIGNIFICANT CHANGE UP (ref 0.5–1.3)
EGFR: 87 ML/MIN/1.73M2 — SIGNIFICANT CHANGE UP
GLUCOSE SERPL-MCNC: 166 MG/DL — HIGH (ref 70–99)
MAGNESIUM SERPL-MCNC: 2.1 MG/DL — SIGNIFICANT CHANGE UP (ref 1.6–2.6)
PHOSPHATE SERPL-MCNC: 3.2 MG/DL — SIGNIFICANT CHANGE UP (ref 2.5–4.5)
POTASSIUM SERPL-MCNC: 4.8 MMOL/L — SIGNIFICANT CHANGE UP (ref 3.5–5.3)
POTASSIUM SERPL-SCNC: 4.8 MMOL/L — SIGNIFICANT CHANGE UP (ref 3.5–5.3)
SODIUM SERPL-SCNC: 129 MMOL/L — LOW (ref 135–145)

## 2024-03-07 PROCEDURE — 99232 SBSQ HOSP IP/OBS MODERATE 35: CPT

## 2024-03-07 RX ADMIN — CAPTOPRIL 25 MILLIGRAM(S): 12.5 TABLET ORAL at 18:02

## 2024-03-07 RX ADMIN — Medication 2 PUFF(S): at 08:31

## 2024-03-07 RX ADMIN — CAPTOPRIL 25 MILLIGRAM(S): 12.5 TABLET ORAL at 11:58

## 2024-03-07 RX ADMIN — ENOXAPARIN SODIUM 40 MILLIGRAM(S): 100 INJECTION SUBCUTANEOUS at 12:38

## 2024-03-07 RX ADMIN — POLYETHYLENE GLYCOL 3350 17 GRAM(S): 17 POWDER, FOR SOLUTION ORAL at 18:01

## 2024-03-07 RX ADMIN — CAPTOPRIL 25 MILLIGRAM(S): 12.5 TABLET ORAL at 06:44

## 2024-03-07 RX ADMIN — LATANOPROST 1 DROP(S): 0.05 SOLUTION/ DROPS OPHTHALMIC; TOPICAL at 22:33

## 2024-03-07 RX ADMIN — TAMSULOSIN HYDROCHLORIDE 0.4 MILLIGRAM(S): 0.4 CAPSULE ORAL at 22:32

## 2024-03-07 RX ADMIN — PIPERACILLIN AND TAZOBACTAM 25 GRAM(S): 4; .5 INJECTION, POWDER, LYOPHILIZED, FOR SOLUTION INTRAVENOUS at 22:33

## 2024-03-07 RX ADMIN — PIPERACILLIN AND TAZOBACTAM 25 GRAM(S): 4; .5 INJECTION, POWDER, LYOPHILIZED, FOR SOLUTION INTRAVENOUS at 14:09

## 2024-03-07 RX ADMIN — Medication 2 PUFF(S): at 22:33

## 2024-03-07 RX ADMIN — ATORVASTATIN CALCIUM 20 MILLIGRAM(S): 80 TABLET, FILM COATED ORAL at 22:33

## 2024-03-07 RX ADMIN — CHLORHEXIDINE GLUCONATE 1 APPLICATION(S): 213 SOLUTION TOPICAL at 12:38

## 2024-03-07 RX ADMIN — POLYETHYLENE GLYCOL 3350 17 GRAM(S): 17 POWDER, FOR SOLUTION ORAL at 05:00

## 2024-03-07 RX ADMIN — SENNA PLUS 1 TABLET(S): 8.6 TABLET ORAL at 11:59

## 2024-03-07 RX ADMIN — PIPERACILLIN AND TAZOBACTAM 25 GRAM(S): 4; .5 INJECTION, POWDER, LYOPHILIZED, FOR SOLUTION INTRAVENOUS at 05:48

## 2024-03-07 NOTE — PROGRESS NOTE ADULT - SUBJECTIVE AND OBJECTIVE BOX
SICU Daily Progress Note  =====================================================  Interval/Overnight Events:     -s/p x1 dose Tolvaptan 15 mg x 1, salt tabs/fluid restriction dc'd per Renal, Na uptrending 118->124->126        MEDICATIONS:   --------------------------------------------------------------------------------------  Neurologic Medications    Respiratory Medications  buDESOnide   90 MICROgram(s) Inhaler 2 Puff(s) Inhalation two times a day    Cardiovascular Medications  captopril 25 milliGRAM(s) Oral <User Schedule>  propranolol 80 milliGRAM(s) Oral daily    Gastrointestinal Medications  bisacodyl Suppository 10 milliGRAM(s) Rectal daily PRN Constipation  polyethylene glycol 3350 17 Gram(s) Oral two times a day  senna 1 Tablet(s) Oral daily    Genitourinary Medications  tamsulosin 0.4 milliGRAM(s) Oral at bedtime    Hematologic/Oncologic Medications  enoxaparin Injectable 40 milliGRAM(s) SubCutaneous every 24 hours    Antimicrobial/Immunologic Medications  piperacillin/tazobactam IVPB.. 3.375 Gram(s) IV Intermittent every 8 hours    Endocrine/Metabolic Medications  atorvastatin 20 milliGRAM(s) Oral at bedtime    Topical/Other Medications  chlorhexidine 2% Cloths 1 Application(s) Topical daily  latanoprost 0.005% Ophthalmic Solution 1 Drop(s) Both EYES at bedtime    --------------------------------------------------------------------------------------    VITAL SIGNS, INS/OUTS (last 24 hours):  --------------------------------------------------------------------------------------  Vital Signs Last 24 Hrs  T(C): 36.6 (07 Mar 2024 00:00), Max: 36.7 (06 Mar 2024 04:00)  T(F): 97.9 (07 Mar 2024 00:00), Max: 98.1 (06 Mar 2024 20:00)  HR: 71 (07 Mar 2024 00:00) (67 - 87)  BP: 123/48 (07 Mar 2024 00:00) (100/52 - 127/58)  BP(mean): 69 (07 Mar 2024 00:00) (67 - 89)  RR: 15 (07 Mar 2024 00:00) (14 - 19)  SpO2: 98% (07 Mar 2024 00:00) (97% - 100%)    Parameters below as of 07 Mar 2024 00:00  Patient On (Oxygen Delivery Method): room air      --------------------------------------------------------------------------------------    EXAM      NEURO: NAD, A&Ox3  HEENT: NC/AT  RESPIRATORY: nonlabored respirations, normal CW expansion  CARDIO: RRR, S1S2  ABDOMEN: soft, nontender, nondistended  EXTREMITIES: normal strength, no deformities    METABOLIC/FLUIDS/ELECTROLYTES      HEMATOLOGIC  [x] VTE Prophylaxis: enoxaparin Injectable 40 milliGRAM(s) SubCutaneous every 24 hours    Transfusions:	[] PRBC	[] Platelets		[] FFP	[] Cryoprecipitate    INFECTIOUS DISEASE  Antimicrobials/Immunologic Medications:  piperacillin/tazobactam IVPB.. 3.375 Gram(s) IV Intermittent every 8 hours      Tubes/Lines/Drains  ***  [x] Peripheral IV  [] Central Venous Line     	[] R	[] L	[] IJ	[] Fem	[] SC	Date Placed:   [] Arterial Line		[] R	[] L	[] Fem	[] Rad	[] Ax	Date Placed:   [] PICC		[] Midline		[] Mediport  [x] Urinary Catheter		Date Placed:   [x] Necessity of urinary, arterial, and venous catheters discussed    LABS  --------------------------------------------------------------------------------------  ((Insert SICU Labs here))***  --------------------------------------------------------------------------------------    OTHER LABORATORY:     IMAGING STUDIES:   CXR:     ASSESSMENT:  79y Male w pmhx of Afib on Eliquis s/p recent left inguinal hernia repair presents with impressive groin and scrotal hematoma after multiple falls at home postoperatively. Patient incidentally found to be hyponatremic 106s. SICU consulted for management hyponatremia    PLAN:   Neurologic:   - A&Ox3 per baseline  - Monitor mental status    Respiratory:   - satting well on RA,     Cardiovascular:   - hold eliquis i/s/o hematoma  - continue captopril, atorvastatin, propranolol     Gastrointestinal/Nutrition:   -regular diet  - bowel regimen    Renal/Genitourinary:   - severe hypotonic hyponatremia likely 2/2 SIADH  - s/p 1.5% NS   - Goal Na within 24 hours 127-129  - s/p Tolvaptan 15 mg x 1, dc salt tabs/fluid restriction  - trend BMP q12  - continue flomax   - Nava     Hematologic:   - DVT ppx    Infectious Disease:   - zosyn 3/4-  - trend WBC    Lines/Tubes:  - PIV  - Nava      Endocrine:   - trend glucose on BMP    Disposition:   - Listed for surgical floors      --------------------------------------------------------------------------------------    Critical Care Diagnoses:

## 2024-03-07 NOTE — PROGRESS NOTE ADULT - ASSESSMENT
79 year old man with afib on eliquis and recent [facility] 2/22/24 by Dr. Bain, now with falls x2 after resuming Eliquis Hyponatremia to 106 on BMP at admission, patient admitted to SICU for management. Patient is listed for floor.     Plan:  - Holding Eliquis  - Diet: regular  - Miralax  - Appreciate nephro recs: "Na remains low   s/p  tolvaptan 15 mg this AM  D/C fluid restriction   D/C Na tabs   Repeat Na level at 4PM--and call Dr Altamirano with results"  - Dispo- pending transfer to floor    A Team Surgery   [id number]

## 2024-03-07 NOTE — PROGRESS NOTE ADULT - SUBJECTIVE AND OBJECTIVE BOX
A Team Surgery Daily Progress Note    Subjective:   Patient seen at bedside this AM. Reports feeling well, mild weakness on ambulation secondary to hospitalization associated deconditioning. Last BM 2 days ago. No N/V, tolerating diet. Eager to be discharged. Denies CP/SOB/abdominal pain. No other concerns reported.    24h Events:   - -s/p x1 dose Tolvaptan 15 mg x 1, salt tabs/fluid restriction dc'd per Renal, Na uptrending 118->124->126      Objective:  Vital Signs  T(C): 36.4 (03-07 @ 04:00), Max: 36.7 (03-06 @ 20:00)  HR: 78 (03-07 @ 04:00) (67 - 87)  BP: 129/42 (03-07 @ 04:00) (100/52 - 129/42)  RR: 13 (03-07 @ 04:00) (13 - 17)  SpO2: 98% (03-07 @ 04:00) (97% - 100%)  03-06-24 @ 07:01  -  03-07-24 @ 07:00  --------------------------------------------------------  IN:  Total IN: 0 mL    OUT:    Indwelling Catheter - Urethral (mL): 2525 mL  Total OUT: 2525 mL    Total NET: -2525 mL          Physical Exam:  GEN: resting in bed comfortably in NAD  RESP: no increased WOB  ABD: Soft, nondistended, NTTP. No rebound or guarding. Lower abd with ecchymoses and bruising. Suprapubic area, left testicle and groin with ecchymoses and indurated, improving compared to previous exams EXTR: warm, well-perfused without gross deformities; spontaneous movement in b/l U/L extrem  NEURO: AAOx4    Labs:                        9.7    11.12 )-----------( 254      ( 06 Mar 2024 22:00 )             27.7   03-06    126<L>  |  93<L>  |  16  ----------------------------<  106<H>  5.0   |  24  |  0.91    Ca    8.2<L>      06 Mar 2024 22:00  Phos  3.2     03-06  Mg     2.00     03-06      CAPILLARY BLOOD GLUCOSE          Medications:   MEDICATIONS  (STANDING):  atorvastatin 20 milliGRAM(s) Oral at bedtime  buDESOnide   90 MICROgram(s) Inhaler 2 Puff(s) Inhalation two times a day  captopril 25 milliGRAM(s) Oral <User Schedule>  chlorhexidine 2% Cloths 1 Application(s) Topical daily  enoxaparin Injectable 40 milliGRAM(s) SubCutaneous every 24 hours  latanoprost 0.005% Ophthalmic Solution 1 Drop(s) Both EYES at bedtime  piperacillin/tazobactam IVPB.. 3.375 Gram(s) IV Intermittent every 8 hours  polyethylene glycol 3350 17 Gram(s) Oral two times a day  propranolol 80 milliGRAM(s) Oral daily  senna 1 Tablet(s) Oral daily  tamsulosin 0.4 milliGRAM(s) Oral at bedtime    MEDICATIONS  (PRN):  bisacodyl Suppository 10 milliGRAM(s) Rectal daily PRN Constipation      Imaging:

## 2024-03-07 NOTE — PROGRESS NOTE ADULT - ASSESSMENT
79M w/ PMH of Afib on Eliquis, s/p recent left inguinal hernia repair 7 days ago. 2 recent falls without headstrike, one on tuesday, one earlier today, was sent to ER by surgeon following seeing extensive bruising from waist to bilateral thighs.  Passing gas, denies groin pain, does have some swelling right groin, normal bowel function with soft brown BM today. No HS with fall, fell onto buttocks both times.  Pt reports mild lightheadedness and weakness of legs.  Nephrology consulted for hyponatremia.    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   Pt asymptomatic.  B/L LE edema likely sec to PVD.  Pt reports he has been drinking more than 8 glasses of water daily for years.  Recent decreased solid intake d/t hernia repair.  His Urine /Plasma Electrolyte ratio was 1.000. Hence he may need help other than fluid restriction for correction.  s/p  tolvaptan 15 mg 3/6.  Na improving.  Resume fluid restriction to 1L/day.  Off NaCl tabs.  Monitor Na.    HTN:  Fluctuating.  c/w BP meds  Management per ICU.  Monitor BP.    Anemia:  Workup and management per ICU.  Transfuse for Hgb <8.  Monitor Hgb.    Hypocalcemia:  Marginal but stable.  Consider checking Vit. D level.  Albumin level is low.  Replete per ICU.  Monitor Ca.    Hypophosphatemia:  Likely sec to poor intake of solids.  Repleted by ICU w/ Kphos 15mmol 3/3  Replete as needed.  Stable.  Monitor    Proteinuria:  Trace on UA.  UPr/Cr 0.2gm.  Monitor.    D/W family at bedside.

## 2024-03-07 NOTE — PROGRESS NOTE ADULT - SUBJECTIVE AND OBJECTIVE BOX
Cardiovascular Disease Progress Note    Overnight events: No acute events overnight.  no new cardiac sx  Otherwise review of systems negative    Objective Findings:  T(C): 36.4 (03-07-24 @ 04:00), Max: 36.7 (03-06-24 @ 20:00)  HR: 78 (03-07-24 @ 04:00) (67 - 87)  BP: 129/42 (03-07-24 @ 04:00) (100/52 - 129/42)  RR: 13 (03-07-24 @ 04:00) (13 - 17)  SpO2: 98% (03-07-24 @ 04:00) (97% - 100%)  Wt(kg): --  Daily     Daily       Physical Exam:  Gen: NAD  HEENT: EOMI  CV: RRR, normal S1 + S2, no m/r/g  Lungs: CTAB  Abd: soft, non-tender  Ext: No edema    Telemetry:    Laboratory Data:                        9.7    11.12 )-----------( 254      ( 06 Mar 2024 22:00 )             27.7     03-06    126<L>  |  93<L>  |  16  ----------------------------<  106<H>  5.0   |  24  |  0.91    Ca    8.2<L>      06 Mar 2024 22:00  Phos  3.2     03-06  Mg     2.00     03-06                Inpatient Medications:  MEDICATIONS  (STANDING):  atorvastatin 20 milliGRAM(s) Oral at bedtime  buDESOnide   90 MICROgram(s) Inhaler 2 Puff(s) Inhalation two times a day  captopril 25 milliGRAM(s) Oral <User Schedule>  chlorhexidine 2% Cloths 1 Application(s) Topical daily  enoxaparin Injectable 40 milliGRAM(s) SubCutaneous every 24 hours  latanoprost 0.005% Ophthalmic Solution 1 Drop(s) Both EYES at bedtime  piperacillin/tazobactam IVPB.. 3.375 Gram(s) IV Intermittent every 8 hours  polyethylene glycol 3350 17 Gram(s) Oral two times a day  propranolol 80 milliGRAM(s) Oral daily  senna 1 Tablet(s) Oral daily  tamsulosin 0.4 milliGRAM(s) Oral at bedtime      Assessment:  79y Male w pmhx of Afib on Eliquis s/p recent LIHR  and 2 recent falls w extensive bruising and associated groin and scrotal hematoma. Incidentally, labs found pt to be hyponatremic    Recs:  cardiac stable  care per sicu  vol status stable. fluid restriction per renal  AC on hold for persistent afib, resume when able. to consider watchman evaluation as outpatient. eps dr toribio involved  tele monitoring  hyponatremia, tx per renal  transfuse prn to hg > 7  dvt ppx as able      Over 25 minutes spent on total encounter; more than 50% of the visit was spent counseling and/or coordinating care by the attending physician.      Steve Gu MD   Cardiovascular Disease  [phone]

## 2024-03-07 NOTE — PROGRESS NOTE ADULT - SUBJECTIVE AND OBJECTIVE BOX
[facility] [place] [facility]   MD DON WHITE MD ANGELA WONG, PA QIAN CHEN, TORITO    TEL:  OFFICE: [phone]  From 5pm-7am Answering Service [phone]    -- RENAL FOLLOW UP NOTE ---Date of Service 03-07-24 @ 12:32    Patient is a 79y old  Male who presents with a chief complaint of Hyponatremia, hypo-osmolarity, or hypo-osmolar hyponatremia    Patient seen and examined at bedside in ICU. No chest pain/SOB.    VITALS:  T(F): 98 (03-07-24 @ 12:00), Max: 98.1 (03-06-24 @ 20:00)  HR: 77 (03-07-24 @ 12:00)  BP: 110/48 (03-07-24 @ 12:00)  RR: 21 (03-07-24 @ 12:00)  SpO2: 98% (03-07-24 @ 12:00)  Wt(kg): --    03-06 @ 07:01  -  03-07 @ 07:00  --------------------------------------------------------  IN: 700 mL / OUT: 2525 mL / NET: -1825 mL    03-07 @ 07:01  -  03-07 @ 12:32  --------------------------------------------------------  IN: 0 mL / OUT: 360 mL / NET: -360 mL    PHYSICAL EXAM:  General: NAD  Neck: No JVD  Respiratory: CTAB, no wheezes, rales or rhonchi  Cardiovascular: S1, S2, RRR  Gastrointestinal: BS+, soft, NT/ND  Extremities: 1+ b/l LE edema.    Hospital Medications:   MEDICATIONS  (STANDING):  atorvastatin 20 milliGRAM(s) Oral at bedtime  buDESOnide   90 MICROgram(s) Inhaler 2 Puff(s) Inhalation two times a day  captopril 25 milliGRAM(s) Oral <User Schedule>  chlorhexidine 2% Cloths 1 Application(s) Topical daily  enoxaparin Injectable 40 milliGRAM(s) SubCutaneous every 24 hours  latanoprost 0.005% Ophthalmic Solution 1 Drop(s) Both EYES at bedtime  piperacillin/tazobactam IVPB.. 3.375 Gram(s) IV Intermittent every 8 hours  polyethylene glycol 3350 17 Gram(s) Oral two times a day  propranolol 80 milliGRAM(s) Oral daily  senna 1 Tablet(s) Oral daily  tamsulosin 0.4 milliGRAM(s) Oral at bedtime      LABS:  03-07    129<L>  |  96<L>  |  15  ----------------------------<  166<H>  4.8   |  25  |  0.89    Ca    8.1<L>      07 Mar 2024 09:30  Phos  3.2     03-07  Mg     2.10     03-07      Creatinine Trend: 0.89 <--, 0.91 <--, 0.97 <--, 0.77 <--, 0.77 <--, 0.74 <--, 0.76 <--, 0.70 <--, 0.70 <--, 0.76 <--, 0.74 <--, 0.67 <--, 0.71 <--, 0.76 <--, 0.88 <--, 0.67 <--, 0.74 <--, 0.70 <--, 0.61 <--, 0.58 <--, 0.59 <--, 0.61 <--    Phosphorus: 3.2 mg/dL (03-07 @ 09:30)  Phosphorus: 3.2 mg/dL (03-06 @ 22:00)  Phosphorus: 3.0 mg/dL (03-06 @ 16:00)                          9.7    11.12 )-----------( 254      ( 06 Mar 2024 22:00 )             27.7     Urine Studies:  Urinalysis - [03-07-24 @ 09:30]      Color  / Appearance  / SG  / pH       Gluc 166 / Ketone   / Bili  / Urobili        Blood  / Protein  / Leuk Est  / Nitrite       RBC  / WBC  / Hyaline  / Gran  / Sq Epi  / Non Sq Epi  / Bacteria     Urine Creatinine 99      [03-01-24 @ 02:01]  Urine Protein 25      [03-01-24 @ 02:01]  Urine Sodium 41      [03-01-24 @ 02:01]  Urine Urea Nitrogen 990.0      [03-01-24 @ 02:01]  Urine Potassium 66.6      [03-01-24 @ 02:01]  Urine Osmolality 596      [03-01-24 @ 02:01]    TSH 2.38      [03-04-24 @ 02:10]

## 2024-03-08 LAB
ANION GAP SERPL CALC-SCNC: 10 MMOL/L — SIGNIFICANT CHANGE UP (ref 7–14)
BUN SERPL-MCNC: 15 MG/DL — SIGNIFICANT CHANGE UP (ref 7–23)
CALCIUM SERPL-MCNC: 8.2 MG/DL — LOW (ref 8.4–10.5)
CHLORIDE SERPL-SCNC: 96 MMOL/L — LOW (ref 98–107)
CO2 SERPL-SCNC: 25 MMOL/L — SIGNIFICANT CHANGE UP (ref 22–31)
CREAT SERPL-MCNC: 0.83 MG/DL — SIGNIFICANT CHANGE UP (ref 0.5–1.3)
EGFR: 89 ML/MIN/1.73M2 — SIGNIFICANT CHANGE UP
GLUCOSE SERPL-MCNC: 105 MG/DL — HIGH (ref 70–99)
HCT VFR BLD CALC: 28 % — LOW (ref 39–50)
HGB BLD-MCNC: 9.4 G/DL — LOW (ref 13–17)
MAGNESIUM SERPL-MCNC: 2.1 MG/DL — SIGNIFICANT CHANGE UP (ref 1.6–2.6)
MAGNESIUM SERPL-MCNC: 2.2 MG/DL — SIGNIFICANT CHANGE UP (ref 1.6–2.6)
MCHC RBC-ENTMCNC: 31 PG — SIGNIFICANT CHANGE UP (ref 27–34)
MCHC RBC-ENTMCNC: 33.6 GM/DL — SIGNIFICANT CHANGE UP (ref 32–36)
MCV RBC AUTO: 92.4 FL — SIGNIFICANT CHANGE UP (ref 80–100)
NRBC # BLD: 0 /100 WBCS — SIGNIFICANT CHANGE UP (ref 0–0)
NRBC # FLD: 0 K/UL — SIGNIFICANT CHANGE UP (ref 0–0)
PHOSPHATE SERPL-MCNC: 3.3 MG/DL — SIGNIFICANT CHANGE UP (ref 2.5–4.5)
PHOSPHATE SERPL-MCNC: 3.3 MG/DL — SIGNIFICANT CHANGE UP (ref 2.5–4.5)
PLATELET # BLD AUTO: 233 K/UL — SIGNIFICANT CHANGE UP (ref 150–400)
POTASSIUM SERPL-MCNC: 4.3 MMOL/L — SIGNIFICANT CHANGE UP (ref 3.5–5.3)
POTASSIUM SERPL-SCNC: 4.3 MMOL/L — SIGNIFICANT CHANGE UP (ref 3.5–5.3)
RBC # BLD: 3.03 M/UL — LOW (ref 4.2–5.8)
RBC # FLD: 15.3 % — HIGH (ref 10.3–14.5)
SODIUM SERPL-SCNC: 131 MMOL/L — LOW (ref 135–145)
WBC # BLD: 8.75 K/UL — SIGNIFICANT CHANGE UP (ref 3.8–10.5)
WBC # FLD AUTO: 8.75 K/UL — SIGNIFICANT CHANGE UP (ref 3.8–10.5)

## 2024-03-08 RX ORDER — APIXABAN 2.5 MG/1
5 TABLET, FILM COATED ORAL EVERY 12 HOURS
Refills: 0 | Status: DISCONTINUED | OUTPATIENT
Start: 2024-03-08 | End: 2024-03-10

## 2024-03-08 RX ADMIN — ATORVASTATIN CALCIUM 20 MILLIGRAM(S): 80 TABLET, FILM COATED ORAL at 21:18

## 2024-03-08 RX ADMIN — POLYETHYLENE GLYCOL 3350 17 GRAM(S): 17 POWDER, FOR SOLUTION ORAL at 05:19

## 2024-03-08 RX ADMIN — ENOXAPARIN SODIUM 40 MILLIGRAM(S): 100 INJECTION SUBCUTANEOUS at 12:38

## 2024-03-08 RX ADMIN — Medication 2 PUFF(S): at 10:07

## 2024-03-08 RX ADMIN — CAPTOPRIL 25 MILLIGRAM(S): 12.5 TABLET ORAL at 12:58

## 2024-03-08 RX ADMIN — LATANOPROST 1 DROP(S): 0.05 SOLUTION/ DROPS OPHTHALMIC; TOPICAL at 21:18

## 2024-03-08 RX ADMIN — CAPTOPRIL 25 MILLIGRAM(S): 12.5 TABLET ORAL at 05:19

## 2024-03-08 RX ADMIN — APIXABAN 5 MILLIGRAM(S): 2.5 TABLET, FILM COATED ORAL at 18:41

## 2024-03-08 RX ADMIN — Medication 2 PUFF(S): at 21:18

## 2024-03-08 RX ADMIN — POLYETHYLENE GLYCOL 3350 17 GRAM(S): 17 POWDER, FOR SOLUTION ORAL at 18:41

## 2024-03-08 RX ADMIN — SENNA PLUS 1 TABLET(S): 8.6 TABLET ORAL at 12:39

## 2024-03-08 RX ADMIN — PIPERACILLIN AND TAZOBACTAM 25 GRAM(S): 4; .5 INJECTION, POWDER, LYOPHILIZED, FOR SOLUTION INTRAVENOUS at 21:18

## 2024-03-08 RX ADMIN — TAMSULOSIN HYDROCHLORIDE 0.4 MILLIGRAM(S): 0.4 CAPSULE ORAL at 21:24

## 2024-03-08 RX ADMIN — PIPERACILLIN AND TAZOBACTAM 25 GRAM(S): 4; .5 INJECTION, POWDER, LYOPHILIZED, FOR SOLUTION INTRAVENOUS at 05:18

## 2024-03-08 RX ADMIN — PIPERACILLIN AND TAZOBACTAM 25 GRAM(S): 4; .5 INJECTION, POWDER, LYOPHILIZED, FOR SOLUTION INTRAVENOUS at 14:24

## 2024-03-08 NOTE — PROGRESS NOTE ADULT - SUBJECTIVE AND OBJECTIVE BOX
DATE OF SERVICE: 03-08-24 @ 09:35    INTERVAL HPI/OVERNIGHT EVENTS: Patient seen and examined, resting comfortably in chair at bedside awake and alert, eating breakfast. No acute events overnight. Patient reports tolerating diet without nausea, vomiting. Admits to passing flatus and having bowel movements. Patient endorses being out of bed and ambulating. Denies fever, chills, SOB, or chest pain. Patient denies having any pain. He is very eager to be discharged to rehab.     STATUS POST:  Left inguinal hernia repair    POST OPERATIVE DAY #: 15    MEDICATIONS  (STANDING):  atorvastatin 20 milliGRAM(s) Oral at bedtime  buDESOnide   90 MICROgram(s) Inhaler 2 Puff(s) Inhalation two times a day  captopril 25 milliGRAM(s) Oral <User Schedule>  chlorhexidine 2% Cloths 1 Application(s) Topical daily  enoxaparin Injectable 40 milliGRAM(s) SubCutaneous every 24 hours  latanoprost 0.005% Ophthalmic Solution 1 Drop(s) Both EYES at bedtime  piperacillin/tazobactam IVPB.. 3.375 Gram(s) IV Intermittent every 8 hours  polyethylene glycol 3350 17 Gram(s) Oral two times a day  propranolol 80 milliGRAM(s) Oral daily  senna 1 Tablet(s) Oral daily  tamsulosin 0.4 milliGRAM(s) Oral at bedtime    MEDICATIONS  (PRN):  bisacodyl Suppository 10 milliGRAM(s) Rectal daily PRN Constipation      Vital Signs Last 24 Hrs  T(C): 36.9 (08 Mar 2024 05:00), Max: 36.9 (08 Mar 2024 05:00)  T(F): 98.4 (08 Mar 2024 05:00), Max: 98.4 (08 Mar 2024 05:00)  HR: 90 (08 Mar 2024 05:00) (77 - 93)  BP: 135/58 (08 Mar 2024 05:00) (105/53 - 135/58)  BP(mean): 65 (07 Mar 2024 12:00) (65 - 65)  RR: 17 (08 Mar 2024 05:00) (17 - 21)  SpO2: 99% (08 Mar 2024 05:00) (98% - 100%)    Parameters below as of 08 Mar 2024 05:00  Patient On (Oxygen Delivery Method): room air      I&O's Detail    07 Mar 2024 07:01  -  08 Mar 2024 07:00  --------------------------------------------------------  IN:    IV PiggyBack: 200 mL    Oral Fluid: 480 mL  Total IN: 680 mL    OUT:    Indwelling Catheter - Urethral (mL): 3360 mL  Total OUT: 3360 mL    Total NET: -2680 mL            Physical Exam:  General: WN/WD NAD  Respiratory: airway patent, respirations unlabored, no increased WoB  Neurology: A&Ox3, nonfocal, DIAZ x 4  Abdominal: Soft, NT, ND, no rebounding or guarding, bruising improving, LIH incision C/D/I  Nava cath to bedside gravity with clear straw-colored urine output      LABS:                        9.4    8.75  )-----------( 233      ( 08 Mar 2024 06:33 )             28.0     03-08    131<L>  |  96<L>  |  15  ----------------------------<  105<H>  4.3   |  25  |  0.83    Ca    8.2<L>      08 Mar 2024 06:33  Phos  3.3     03-08  Mg     2.20     03-08        Urinalysis Basic - ( 08 Mar 2024 06:33 )    Color: x / Appearance: x / SG: x / pH: x  Gluc: 105 mg/dL / Ketone: x  / Bili: x / Urobili: x   Blood: x / Protein: x / Nitrite: x   Leuk Esterase: x / RBC: x / WBC x   Sq Epi: x / Non Sq Epi: x / Bacteria: x

## 2024-03-08 NOTE — PROGRESS NOTE ADULT - ASSESSMENT
79 year old man with afib on eliquis and recent LIHR 2/22/24 by Dr. Bain, now with falls x2 after resuming Eliquis Hyponatremia to 106 on BMP at admission, patient admitted to SICU for management. Patient is listed for floor.     Plan:  - Holding Eliquis  - Diet: regular, 1 L fluid restrictiont  - Miralax  - Appreciate nephro recs  - Monitor BMP  - on zosyn  - continue yung for urine monitoring  - Dispo- pending transfer to floor    A Team Surgery   [id number]

## 2024-03-08 NOTE — CHART NOTE - NSCHARTNOTEFT_GEN_A_CORE
Source: Patient [X]    Family [ ]     Other (Chart Review)[X]    Patient is seen for follow-up nutrition assessment for moderate malnutrition.     Medical Course: Per chart review, patient is a 79y Male with PMH Afib, HTN, hypercholesterolemia who presented to [facility] with extensive bruising/associated groin and scrotal hematoma status post two falls.    Nutrition Course: Patient is currently ordered for a PO diet with 1000mL fluid restriction and Magic Cup BID supplementation. Patient reports a good appetite and PO intake at meals and of Magic Cup supplement during course of admission. Documented on RN flowsheet as consuming % of meals. Patient denies any chewing or swallowing difficulty with current diet order. No report of GI distress (nausea, vomiting, diarrhea, constipation).     Diet : Diet, Regular:   1000mL Fluid Restriction ([id number]) (03-08-24 @ 05:53)    Anthropometrics  Weights: 102.1kg (3/1)  % Weight Change: No weight changes since previous assessment    Pertinent Medications: MEDICATIONS  (STANDING):  apixaban 5 milliGRAM(s) Oral every 12 hours  atorvastatin 20 milliGRAM(s) Oral at bedtime  buDESOnide   90 MICROgram(s) Inhaler 2 Puff(s) Inhalation two times a day  captopril 25 milliGRAM(s) Oral <User Schedule>  chlorhexidine 2% Cloths 1 Application(s) Topical daily  latanoprost 0.005% Ophthalmic Solution 1 Drop(s) Both EYES at bedtime  piperacillin/tazobactam IVPB.. 3.375 Gram(s) IV Intermittent every 8 hours  polyethylene glycol 3350 17 Gram(s) Oral two times a day  propranolol 80 milliGRAM(s) Oral daily  senna 1 Tablet(s) Oral daily  tamsulosin 0.4 milliGRAM(s) Oral at bedtime    MEDICATIONS  (PRN):  bisacodyl Suppository 10 milliGRAM(s) Rectal daily PRN Constipation    Pertinent Labs:  03-08 Na131 mmol/L<L> Glu 105 mg/dL<H> K+ 4.3 mmol/L Cr  0.83 mg/dL BUN 15 mg/dL 03-08 Phos 3.3 mg/dL 03-02 Alb 3.1 g/dL<L>    Skin: No pressure ulcers/injuries documented per RN flowsheets     Fluid: Edema 1+ (left leg, right leg) per RN flowsheet    GI: Last BM 3/7/24 per RN flowsheet documentation. Noted to be on a bowel regimen.     Estimated Needs:   [X] no change since previous assessment  Weight Used: Ideal Body Weight 184lb/83.5kg  Estimated Energy Needs: 2087-2505kcal (based on 25-30kcal/kg)  Estimated Protein Needs: 100.2-116.9gms (based on 1.2-1.4gms/kg)     Previous Nutrition Diagnosis: Moderate malnutrition    Nutrition Diagnosis is [X] ongoing    New Nutrition Diagnosis: Not applicable    Education: [X] Given today    Type of education provided: Writer provided verbal education regarding current diet order and nutrition recommendations for after discharge. Patient verbalized understanding to the discussion.     Nutrition Recommendations  - Continue current diet order as tolerated  - Continue Magic Cup (provides 290kcal, 9gms protein per serving) BID ( Food and Nutrition Department will provide )  - Defer fluid management to medical team  - Monitor weights, labs, BM's, skin integrity, p.o. intake.   - Please monitor % PO intake on flowsheets   - RD remains available, please consult as needed    Monitoring and Evaluation:   [X] PO intake [ ] Tolerance to diet prescription [X] weights [X] follow up per protocol    Ena Reis MS RDN CDN  On [employer] Teams or Pager #[phone]

## 2024-03-08 NOTE — PROGRESS NOTE ADULT - SUBJECTIVE AND OBJECTIVE BOX
[facility]   MD DON WHITE MD ANGELA WONG, PA QIAN CHEN, TORITO    TEL:  OFFICE: [phone]  From 5pm-7am Answering Service [phone]    -- RENAL FOLLOW UP NOTE ---Date of Service 03-08-24 @ 16:12    Patient is a 79y old  Male who presents with a chief complaint of Hyponatremia, hypo-osmolarity, or hypo-osmolar hyponatremia.    Patient seen and examined at bedside. No chest pain/SOB.    VITALS:  T(F): 98.1 (03-08-24 @ 14:05), Max: 98.4 (03-08-24 @ 05:00)  HR: 70 (03-08-24 @ 14:05)  BP: 104/58 (03-08-24 @ 14:05)  RR: 18 (03-08-24 @ 14:05)  SpO2: 99% (03-08-24 @ 14:05)  Wt(kg): --    03-07 @ 07:01  -  03-08 @ 07:00  --------------------------------------------------------  IN: 680 mL / OUT: 3360 mL / NET: -2680 mL    03-08 @ 07:01  -  03-08 @ 16:12  --------------------------------------------------------  IN: 240 mL / OUT: 200 mL / NET: 40 mL    PHYSICAL EXAM:  General: NAD  Neck: No JVD  Respiratory: CTAB, no wheezes, rales or rhonchi  Cardiovascular: S1, S2, RRR  Gastrointestinal: BS+, soft, NT/ND  Extremities: 1+ b/l LE edema    Hospital Medications:   MEDICATIONS  (STANDING):  apixaban 5 milliGRAM(s) Oral every 12 hours  atorvastatin 20 milliGRAM(s) Oral at bedtime  buDESOnide   90 MICROgram(s) Inhaler 2 Puff(s) Inhalation two times a day  captopril 25 milliGRAM(s) Oral <User Schedule>  chlorhexidine 2% Cloths 1 Application(s) Topical daily  latanoprost 0.005% Ophthalmic Solution 1 Drop(s) Both EYES at bedtime  piperacillin/tazobactam IVPB.. 3.375 Gram(s) IV Intermittent every 8 hours  polyethylene glycol 3350 17 Gram(s) Oral two times a day  propranolol 80 milliGRAM(s) Oral daily  senna 1 Tablet(s) Oral daily  tamsulosin 0.4 milliGRAM(s) Oral at bedtime      LABS:  03-08    131<L>  |  96<L>  |  15  ----------------------------<  105<H>  4.3   |  25  |  0.83    Ca    8.2<L>      08 Mar 2024 06:33  Phos  3.3     03-08  Mg     2.20     03-08      Creatinine Trend: 0.83 <--, 0.89 <--, 0.91 <--, 0.97 <--, 0.77 <--, 0.77 <--, 0.74 <--, 0.76 <--, 0.70 <--, 0.70 <--, 0.76 <--, 0.74 <--, 0.67 <--, 0.71 <--, 0.76 <--, 0.88 <--, 0.67 <--, 0.74 <--, 0.70 <--    Phosphorus: 3.3 mg/dL (03-08 @ 06:33)  Phosphorus: 3.3 mg/dL (03-08 @ 06:33)                              9.4    8.75  )-----------( 233      ( 08 Mar 2024 06:33 )             28.0     Urine Studies:  Urinalysis - [03-08-24 @ 06:33]      Color  / Appearance  / SG  / pH       Gluc 105 / Ketone   / Bili  / Urobili        Blood  / Protein  / Leuk Est  / Nitrite       RBC  / WBC  / Hyaline  / Gran  / Sq Epi  / Non Sq Epi  / Bacteria       TSH 2.38      [03-04-24 @ 02:10]

## 2024-03-08 NOTE — PROGRESS NOTE ADULT - ASSESSMENT
Patient seen and examined with Dr. Sevilla  Patient is a 79y old Man s/p left inguinal hernia repair, POD # 15, presented to the ED after falls x2 after resuming Eliquis, hyponatremia to 106 on BMP at admission, recovering on the floor.      PLAN:  -Diet: Regular  -Activity: OOBA  -Follow BMP  -Appreciate nephro recs   -D/C dilshad REZA Team Surgery: [id number]

## 2024-03-08 NOTE — PROGRESS NOTE ADULT - SUBJECTIVE AND OBJECTIVE BOX
Cardiovascular Disease Progress Note    Overnight events: No acute events overnight.  no new crdiac sx  Otherwise review of systems negative    Objective Findings:  T(C): 36.9 (03-08-24 @ 05:00), Max: 36.9 (03-08-24 @ 05:00)  HR: 90 (03-08-24 @ 05:00) (51 - 93)  BP: 135/58 (03-08-24 @ 05:00) (91/45 - 135/58)  RR: 17 (03-08-24 @ 05:00) (15 - 21)  SpO2: 99% (03-08-24 @ 05:00) (96% - 100%)  Wt(kg): --  Daily     Daily       Physical Exam:  Gen: NAD  HEENT: EOMI  CV: RRR, normal S1 + S2, no m/r/g  Lungs: CTAB  Abd: soft, non-tender  Ext: No edema    Telemetry:    Laboratory Data:                        9.4    8.75  )-----------( 233      ( 08 Mar 2024 06:33 )             28.0     03-08    131<L>  |  96<L>  |  15  ----------------------------<  105<H>  4.3   |  25  |  0.83    Ca    8.2<L>      08 Mar 2024 06:33  Phos  3.3     03-08  Mg     2.20     03-08                Inpatient Medications:  MEDICATIONS  (STANDING):  atorvastatin 20 milliGRAM(s) Oral at bedtime  buDESOnide   90 MICROgram(s) Inhaler 2 Puff(s) Inhalation two times a day  captopril 25 milliGRAM(s) Oral <User Schedule>  chlorhexidine 2% Cloths 1 Application(s) Topical daily  enoxaparin Injectable 40 milliGRAM(s) SubCutaneous every 24 hours  latanoprost 0.005% Ophthalmic Solution 1 Drop(s) Both EYES at bedtime  piperacillin/tazobactam IVPB.. 3.375 Gram(s) IV Intermittent every 8 hours  polyethylene glycol 3350 17 Gram(s) Oral two times a day  propranolol 80 milliGRAM(s) Oral daily  senna 1 Tablet(s) Oral daily  tamsulosin 0.4 milliGRAM(s) Oral at bedtime      Assessment:  79y Male w pmhx of Afib on Eliquis s/p recent LIHR  and 2 recent falls w extensive bruising and associated groin and scrotal hematoma. Incidentally, labs found pt to be hyponatremic    Recs:  cardiac stable  care per sicu  vol status stable. fluid restriction per renal  AC on hold for persistent afib, can likely resume. to consider watchman evaluation as outpatient. eps dr toribio involved  tele monitoring  hyponatremia, tx per renal  transfuse prn to hg > 7  dvt ppx as able          Over 25 minutes spent on total encounter; more than 50% of the visit was spent counseling and/or coordinating care by the attending physician.      Steve Gu MD   Cardiovascular Disease  [phone]

## 2024-03-08 NOTE — PROGRESS NOTE ADULT - SUBJECTIVE AND OBJECTIVE BOX
TEAM [ A ] Surgery Daily Progress Note  =====================================================    SUBJECTIVE: Patient seen and examined at bedside on AM rounds. Patient denies any acute complaints. Tolerating diet, denies nausea, vomiting.   OOB/Amublating as tolerated. Denies fever, chills.      ALLERGIES:  tetracycline (Hives; Rash)      --------------------------------------------------------------------------------------    MEDICATIONS:    Neurologic Medications    Respiratory Medications  buDESOnide   90 MICROgram(s) Inhaler 2 Puff(s) Inhalation two times a day    Cardiovascular Medications  captopril 25 milliGRAM(s) Oral <User Schedule>  propranolol 80 milliGRAM(s) Oral daily    Gastrointestinal Medications  bisacodyl Suppository 10 milliGRAM(s) Rectal daily PRN Constipation  polyethylene glycol 3350 17 Gram(s) Oral two times a day  senna 1 Tablet(s) Oral daily    Genitourinary Medications  tamsulosin 0.4 milliGRAM(s) Oral at bedtime    Hematologic/Oncologic Medications  enoxaparin Injectable 40 milliGRAM(s) SubCutaneous every 24 hours    Antimicrobial/Immunologic Medications  piperacillin/tazobactam IVPB.. 3.375 Gram(s) IV Intermittent every 8 hours    Endocrine/Metabolic Medications  atorvastatin 20 milliGRAM(s) Oral at bedtime    Topical/Other Medications  chlorhexidine 2% Cloths 1 Application(s) Topical daily  latanoprost 0.005% Ophthalmic Solution 1 Drop(s) Both EYES at bedtime    --------------------------------------------------------------------------------------    VITAL SIGNS:  T(C): 36.9 (03-08-24 @ 05:00), Max: 36.9 (03-08-24 @ 05:00)  HR: 90 (03-08-24 @ 05:00) (51 - 93)  BP: 135/58 (03-08-24 @ 05:00) (91/45 - 135/58)  RR: 17 (03-08-24 @ 05:00) (15 - 21)  SpO2: 99% (03-08-24 @ 05:00) (96% - 100%)  --------------------------------------------------------------------------------------    EXAM    General: NAD, resting in bed comfortably.  Cardiac: regular rate, warm and well perfused  Respiratory: Nonlabored respirations, normal cw expansion.  Abdomen: soft, nontender, nondistended. bruising improving, inguinal hernia incision c/d/i  Extremities: normal strength, FROM, no deformities    --------------------------------------------------------------------------------------    LABS                        9.7    11.12 )-----------( 254      ( 06 Mar 2024 22:00 )             27.7     --------------------------------------------------------------------------------------    03-07    129<L>  |  96<L>  |  15  ----------------------------<  166<H>  4.8   |  25  |  0.89    Ca    8.1<L>      07 Mar 2024 09:30  Phos  3.2     03-07  Mg     2.10     03-07    INS AND OUTS:    03-06-24 @ 07:01  -  03-07-24 @ 07:00  --------------------------------------------------------  IN: 700 mL / OUT: 2525 mL / NET: -1825 mL    03-07-24 @ 07:01  -  03-08-24 @ 06:55  --------------------------------------------------------  IN: 680 mL / OUT: 3360 mL / NET: -2680 mL      --------------------------------------------------------------------------------------

## 2024-03-08 NOTE — PROGRESS NOTE ADULT - ASSESSMENT
79M w/ PMH of Afib on Eliquis, s/p recent left inguinal hernia repair 7 days ago. 2 recent falls without headstrike, one on tuesday, one earlier today, was sent to ER by surgeon following seeing extensive bruising from waist to bilateral thighs.  Passing gas, denies groin pain, does have some swelling right groin, normal bowel function with soft brown BM today. No HS with fall, fell onto buttocks both times.  Pt reports mild lightheadedness and weakness of legs.  Nephrology consulted for hyponatremia.    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   Pt asymptomatic.  B/L LE edema likely sec to PVD.  Pt reports he has been drinking more than 8 glasses of water daily for years.  Recent decreased solid intake d/t hernia repair.  His Urine /Plasma Electrolyte ratio was 1.000. Hence he may need help other than fluid restriction for correction.  s/p tolvaptan 15 mg 3/6.  Na improving.  Resume fluid restriction to 1L/day.  Off NaCl tabs.  Monitor Na.    HTN:  Fluctuating.  c/w BP meds  Monitor BP.    Anemia:  Workup per primary team.  Transfuse for Hgb <8.  Monitor Hgb.    Hypocalcemia:  Marginal but stable.  Check Vit D 25OH level.  Albumin level is low.  Optimize albumin.  Monitor Ca.    Hypophosphatemia:  Likely sec to poor intake of solids.  Replete as needed.  Stable.  Monitor    Proteinuria:  Trace on UA.  UPr/Cr 0.2gm.  Monitor.

## 2024-03-09 LAB
24R-OH-CALCIDIOL SERPL-MCNC: 8.7 NG/ML — LOW (ref 30–80)
ANION GAP SERPL CALC-SCNC: 10 MMOL/L — SIGNIFICANT CHANGE UP (ref 7–14)
BUN SERPL-MCNC: 13 MG/DL — SIGNIFICANT CHANGE UP (ref 7–23)
CALCIUM SERPL-MCNC: 8.3 MG/DL — LOW (ref 8.4–10.5)
CHLORIDE SERPL-SCNC: 99 MMOL/L — SIGNIFICANT CHANGE UP (ref 98–107)
CO2 SERPL-SCNC: 24 MMOL/L — SIGNIFICANT CHANGE UP (ref 22–31)
CREAT SERPL-MCNC: 0.77 MG/DL — SIGNIFICANT CHANGE UP (ref 0.5–1.3)
EGFR: 91 ML/MIN/1.73M2 — SIGNIFICANT CHANGE UP
GLUCOSE SERPL-MCNC: 99 MG/DL — SIGNIFICANT CHANGE UP (ref 70–99)
HCT VFR BLD CALC: 28.2 % — LOW (ref 39–50)
HGB BLD-MCNC: 9.5 G/DL — LOW (ref 13–17)
MAGNESIUM SERPL-MCNC: 2.1 MG/DL — SIGNIFICANT CHANGE UP (ref 1.6–2.6)
MCHC RBC-ENTMCNC: 31.5 PG — SIGNIFICANT CHANGE UP (ref 27–34)
MCHC RBC-ENTMCNC: 33.7 GM/DL — SIGNIFICANT CHANGE UP (ref 32–36)
MCV RBC AUTO: 93.4 FL — SIGNIFICANT CHANGE UP (ref 80–100)
NRBC # BLD: 0 /100 WBCS — SIGNIFICANT CHANGE UP (ref 0–0)
NRBC # FLD: 0 K/UL — SIGNIFICANT CHANGE UP (ref 0–0)
PHOSPHATE SERPL-MCNC: 3.3 MG/DL — SIGNIFICANT CHANGE UP (ref 2.5–4.5)
PLATELET # BLD AUTO: 207 K/UL — SIGNIFICANT CHANGE UP (ref 150–400)
POTASSIUM SERPL-MCNC: 4.5 MMOL/L — SIGNIFICANT CHANGE UP (ref 3.5–5.3)
POTASSIUM SERPL-SCNC: 4.5 MMOL/L — SIGNIFICANT CHANGE UP (ref 3.5–5.3)
RBC # BLD: 3.02 M/UL — LOW (ref 4.2–5.8)
RBC # FLD: 15.3 % — HIGH (ref 10.3–14.5)
SODIUM SERPL-SCNC: 133 MMOL/L — LOW (ref 135–145)
WBC # BLD: 7.81 K/UL — SIGNIFICANT CHANGE UP (ref 3.8–10.5)
WBC # FLD AUTO: 7.81 K/UL — SIGNIFICANT CHANGE UP (ref 3.8–10.5)

## 2024-03-09 RX ORDER — ERGOCALCIFEROL 1.25 MG/1
50000 CAPSULE ORAL
Refills: 0 | Status: DISCONTINUED | OUTPATIENT
Start: 2024-03-10 | End: 2024-03-10

## 2024-03-09 RX ADMIN — CAPTOPRIL 25 MILLIGRAM(S): 12.5 TABLET ORAL at 17:25

## 2024-03-09 RX ADMIN — CAPTOPRIL 25 MILLIGRAM(S): 12.5 TABLET ORAL at 13:07

## 2024-03-09 RX ADMIN — Medication 2 PUFF(S): at 09:49

## 2024-03-09 RX ADMIN — LATANOPROST 1 DROP(S): 0.05 SOLUTION/ DROPS OPHTHALMIC; TOPICAL at 21:30

## 2024-03-09 RX ADMIN — ATORVASTATIN CALCIUM 20 MILLIGRAM(S): 80 TABLET, FILM COATED ORAL at 21:29

## 2024-03-09 RX ADMIN — PIPERACILLIN AND TAZOBACTAM 25 GRAM(S): 4; .5 INJECTION, POWDER, LYOPHILIZED, FOR SOLUTION INTRAVENOUS at 06:40

## 2024-03-09 RX ADMIN — POLYETHYLENE GLYCOL 3350 17 GRAM(S): 17 POWDER, FOR SOLUTION ORAL at 06:40

## 2024-03-09 RX ADMIN — APIXABAN 5 MILLIGRAM(S): 2.5 TABLET, FILM COATED ORAL at 18:26

## 2024-03-09 RX ADMIN — PIPERACILLIN AND TAZOBACTAM 25 GRAM(S): 4; .5 INJECTION, POWDER, LYOPHILIZED, FOR SOLUTION INTRAVENOUS at 21:29

## 2024-03-09 RX ADMIN — PIPERACILLIN AND TAZOBACTAM 25 GRAM(S): 4; .5 INJECTION, POWDER, LYOPHILIZED, FOR SOLUTION INTRAVENOUS at 14:00

## 2024-03-09 RX ADMIN — TAMSULOSIN HYDROCHLORIDE 0.4 MILLIGRAM(S): 0.4 CAPSULE ORAL at 21:30

## 2024-03-09 RX ADMIN — Medication 2 PUFF(S): at 21:29

## 2024-03-09 RX ADMIN — APIXABAN 5 MILLIGRAM(S): 2.5 TABLET, FILM COATED ORAL at 06:36

## 2024-03-09 NOTE — PROGRESS NOTE ADULT - ASSESSMENT
79M w/ PMH of Afib on Eliquis, s/p recent left inguinal hernia repair 7 days ago. 2 recent falls without headstrike, one on tuesday, one earlier today, was sent to ER by surgeon following seeing extensive bruising from waist to bilateral thighs.  Passing gas, denies groin pain, does have some swelling right groin, normal bowel function with soft brown BM today. No HS with fall, fell onto buttocks both times.  Pt reports mild lightheadedness and weakness of legs.  Nephrology consulted for hyponatremia.    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   Pt asymptomatic.  B/L LE edema likely sec to PVD.  Pt reports he has been drinking more than 8 glasses of water daily for years.  Recent decreased solid intake d/t hernia repair.  His Urine /Plasma Electrolyte ratio was 1.000. Hence he may need help other than fluid restriction for correction.  s/p tolvaptan 15 mg 3/6.  Na improving.  Resume fluid restriction to 1L/day.  Off NaCl tabs.  Pt cleared from nephrology stand point for d/c to rehab.  Monitor Na.    HTN:  Fluctuating.  c/w BP meds  Low salt diet.  Monitor BP.    Anemia:  Workup per primary team.  Transfuse for Hgb <8.  Monitor Hgb.    Hypocalcemia:  Marginal but stable.  Albumin level is low.  Optimize albumin.  Vit D 25OH low @ 8.7.  Start ergocalciferol [id number] weekly.  Monitor Ca.    Hypophosphatemia:  Likely sec to poor intake of solids.  Replete as needed.  Stable.  Monitor    Proteinuria:  Trace on UA.  UPr/Cr 0.2gm.  Monitor.

## 2024-03-09 NOTE — PROGRESS NOTE ADULT - ASSESSMENT
79 year old man with afib on eliquis and recent LIHR 2/22/24 by Dr. Bain, now with falls x2 after resuming Eliquis Hyponatremia to 106 on BMP at admission, patient admitted to SICU for management. Patient transferred to the floor 3/7. Nava was removed and eliquis was restarted on 3/8.       Plan:  - Eliquis   - Diet: regular, 1 L fluid restrictiont  - Miralax  - Appreciate nephro recs  - Monitor BMP  - on zosyn  - Dispo- planning    A Team Surgery   [id number]   79 year old man with afib on eliquis and recent LIHR 2/22/24 by Dr. Bain, now with falls x2 after resuming Eliquis Hyponatremia to 106 on BMP at admission, patient admitted to SICU for management. Patient transferred to the floor 3/7. Nava was removed and Eliquis was restarted on 3/8.       Plan:  - Eliquis   - Diet: regular, 1 L fluid restrictiont  - Miralax  - Appreciate nephro recs  - Monitor BMP  - on zosyn   - Dispo- DC home today and patient family will take to rehab    A Team Surgery   [id number]

## 2024-03-09 NOTE — PROGRESS NOTE ADULT - NS ATTEND AMEND GEN_ALL_CORE FT
Na not changing  tolvaptan 15mg today  d/c fluid restriction and na tab
as above
Na remains critically low but much better than what came with, continue salt tab as suggetsed-monitor Na q12hr
improving sodium
Pt seen/examined, agree with above.    - d/c Nava  - monitor for urinary retention  - resume anticoagulation  - monitor labs in AM  - poss rehab tomorrow pending labs
improving sodiu

## 2024-03-09 NOTE — PROGRESS NOTE ADULT - SUBJECTIVE AND OBJECTIVE BOX
[facility] [place] [facility]   MD DON WHITE MD ANGELA WONG, PA QIAN CHEN, TORITO    TEL:  OFFICE: [phone]  From 5pm-7am Answering Service [phone]    -- RENAL FOLLOW UP NOTE ---Date of Service 03-09-24 @ 15:41    Patient is a 79y old  Male who presents with a chief complaint of Hyponatremia, hypo-osmolarity, or hypo-osmolar hyponatremia      Patient seen and examined at bedside. No chest pain/SOB.    VITALS:  T(F): 97.9 (03-09-24 @ 14:00), Max: 98.4 (03-08-24 @ 22:20)  HR: 82 (03-09-24 @ 14:00)  BP: 129/68 (03-09-24 @ 14:00)  RR: 17 (03-09-24 @ 14:00)  SpO2: 99% (03-09-24 @ 14:00)  Wt(kg): --    03-08 @ 07:01  -  03-09 @ 07:00  --------------------------------------------------------  IN: 540 mL / OUT: 1600 mL / NET: -1060 mL    03-09 @ 06:01  -  03-09 @ 15:41  --------------------------------------------------------  IN: 480 mL / OUT: 625 mL / NET: -145 mL      PHYSICAL EXAM:  General: NAD  Neck: No JVD  Respiratory: CTAB, no wheezes, rales or rhonchi  Cardiovascular: S1, S2, RRR  Gastrointestinal: BS+, soft, NT/ND  Extremities: Trace b/l LE edema    Hospital Medications:   MEDICATIONS  (STANDING):  apixaban 5 milliGRAM(s) Oral every 12 hours  atorvastatin 20 milliGRAM(s) Oral at bedtime  buDESOnide   90 MICROgram(s) Inhaler 2 Puff(s) Inhalation two times a day  captopril 25 milliGRAM(s) Oral <User Schedule>  chlorhexidine 2% Cloths 1 Application(s) Topical daily  latanoprost 0.005% Ophthalmic Solution 1 Drop(s) Both EYES at bedtime  piperacillin/tazobactam IVPB.. 3.375 Gram(s) IV Intermittent every 8 hours  polyethylene glycol 3350 17 Gram(s) Oral two times a day  propranolol 80 milliGRAM(s) Oral daily  senna 1 Tablet(s) Oral daily  tamsulosin 0.4 milliGRAM(s) Oral at bedtime      LABS:  03-09    133<L>  |  99  |  13  ----------------------------<  99  4.5   |  24  |  0.77    Ca    8.3<L>      09 Mar 2024 05:50  Phos  3.3     03-09  Mg     2.10     03-09      Creatinine Trend: 0.77 <--, 0.83 <--, 0.89 <--, 0.91 <--, 0.97 <--, 0.77 <--, 0.77 <--, 0.74 <--, 0.76 <--, 0.70 <--, 0.70 <--, 0.76 <--, 0.74 <--, 0.67 <--, 0.71 <--, 0.76 <--    Phosphorus: 3.3 mg/dL (03-09 @ 05:50)  Vitamin D, 25-Hydroxy: 8.7 ng/mL (03-09 @ 05:50)                          9.5    7.81  )-----------( 207      ( 09 Mar 2024 05:50 )             28.2     Urine Studies:  Urinalysis - [03-09-24 @ 05:50]      Color  / Appearance  / SG  / pH       Gluc 99 / Ketone   / Bili  / Urobili        Blood  / Protein  / Leuk Est  / Nitrite       RBC  / WBC  / Hyaline  / Gran  / Sq Epi  / Non Sq Epi  / Bacteria       Vitamin D (25OH) 8.7      [03-09-24 @ 05:50]  TSH 2.38      [03-04-24 @ 02:10]

## 2024-03-09 NOTE — PROGRESS NOTE ADULT - SUBJECTIVE AND OBJECTIVE BOX
Surgery Progress Note    OVERNIGHT EVENTS: NAEO    SUBJECTIVE: Pt seen and examined at bedside. Patient comfortable and in no-apparent distress. Pain is controlled. Patient tolerating a LRD diet without N/V    Vital Signs Last 24 Hrs  T(C): 36.7 (09 Mar 2024 02:00), Max: 36.9 (08 Mar 2024 22:20)  T(F): 98.1 (09 Mar 2024 02:00), Max: 98.4 (08 Mar 2024 22:20)  HR: 81 (09 Mar 2024 02:00) (62 - 91)  BP: 158/61 (09 Mar 2024 02:00) (104/58 - 158/61)  BP(mean): --  RR: 18 (09 Mar 2024 02:00) (17 - 20)  SpO2: 100% (09 Mar 2024 02:00) (99% - 100%)    Parameters below as of 09 Mar 2024 02:00  Patient On (Oxygen Delivery Method): room air        PHYSICAL EXAM:  General Appearance: Appears well, NAD  Respiratory: No labored breathing  CV: Pulse regularly present  Abdomen: soft, nontender, nondistended. bruising improving, inguinal hernia incision c/d/i  Extremities: normal strength, FROM, no deformities    INs and OUTs:    03-07-24 @ 07:01  -  03-08-24 @ 07:00  --------------------------------------------------------  IN: 680 mL / OUT: 3360 mL / NET: -2680 mL    03-08-24 @ 07:01  -  03-09-24 @ 06:13  --------------------------------------------------------  IN: 300 mL / OUT: 1500 mL / NET: -1200 mL        LABS:                        9.4    8.75  )-----------( 233      ( 08 Mar 2024 06:33 )             28.0     03-08    131<L>  |  96<L>  |  15  ----------------------------<  105<H>  4.3   |  25  |  0.83    Ca    8.2<L>      08 Mar 2024 06:33  Phos  3.3     03-08  Mg     2.20     03-08        Urinalysis Basic - ( 08 Mar 2024 06:33 )    Color: x / Appearance: x / SG: x / pH: x  Gluc: 105 mg/dL / Ketone: x  / Bili: x / Urobili: x   Blood: x / Protein: x / Nitrite: x   Leuk Esterase: x / RBC: x / WBC x   Sq Epi: x / Non Sq Epi: x / Bacteria: x         Surgery Progress Note    OVERNIGHT EVENTS: NAOE    SUBJECTIVE: Pt seen and examined at bedside. Patient comfortable and in no-apparent distress. Pain is controlled. Patient tolerating a LRD diet without N/V. Patient states he is ready to go home.     Vital Signs Last 24 Hrs  T(C): 36.7 (09 Mar 2024 02:00), Max: 36.9 (08 Mar 2024 22:20)  T(F): 98.1 (09 Mar 2024 02:00), Max: 98.4 (08 Mar 2024 22:20)  HR: 81 (09 Mar 2024 02:00) (62 - 91)  BP: 158/61 (09 Mar 2024 02:00) (104/58 - 158/61)  BP(mean): --  RR: 18 (09 Mar 2024 02:00) (17 - 20)  SpO2: 100% (09 Mar 2024 02:00) (99% - 100%)    Parameters below as of 09 Mar 2024 02:00  Patient On (Oxygen Delivery Method): room air        PHYSICAL EXAM:  General Appearance: Appears well, NAD  Respiratory: No labored breathing  CV: Pulse regularly present  Abdomen: soft, nontender, nondistended. bruising improving, inguinal hernia incision c/d/i  Extremities: normal strength, FROM, no deformities    INs and OUTs:    03-07-24 @ 07:01  -  03-08-24 @ 07:00  --------------------------------------------------------  IN: 680 mL / OUT: 3360 mL / NET: -2680 mL    03-08-24 @ 07:01  -  03-09-24 @ 06:13  --------------------------------------------------------  IN: 300 mL / OUT: 1500 mL / NET: -1200 mL        LABS:                        9.4    8.75  )-----------( 233      ( 08 Mar 2024 06:33 )             28.0     03-08    131<L>  |  96<L>  |  15  ----------------------------<  105<H>  4.3   |  25  |  0.83    Ca    8.2<L>      08 Mar 2024 06:33  Phos  3.3     03-08  Mg     2.20     03-08        Urinalysis Basic - ( 08 Mar 2024 06:33 )    Color: x / Appearance: x / SG: x / pH: x  Gluc: 105 mg/dL / Ketone: x  / Bili: x / Urobili: x   Blood: x / Protein: x / Nitrite: x   Leuk Esterase: x / RBC: x / WBC x   Sq Epi: x / Non Sq Epi: x / Bacteria: x

## 2024-03-09 NOTE — PROGRESS NOTE ADULT - SUBJECTIVE AND OBJECTIVE BOX
Cardiovascular Disease Progress Note    Overnight events: No acute events overnight.  no new cardiac sx  Otherwise review of systems negative    Objective Findings:  T(C): 36.4 (03-09-24 @ 06:30), Max: 36.9 (03-08-24 @ 22:20)  HR: 79 (03-09-24 @ 06:30) (62 - 91)  BP: 109/60 (03-09-24 @ 06:30) (104/58 - 158/61)  RR: 16 (03-09-24 @ 06:30) (16 - 20)  SpO2: 100% (03-09-24 @ 06:30) (99% - 100%)  Wt(kg): --  Daily     Daily       Physical Exam:  Gen: NAD  HEENT: EOMI  CV: RRR, normal S1 + S2, no m/r/g  Lungs: CTAB  Abd: soft, non-tender  Ext: No edema    Telemetry:    Laboratory Data:                        9.5    7.81  )-----------( 207      ( 09 Mar 2024 05:50 )             28.2     03-09    133<L>  |  99  |  13  ----------------------------<  99  4.5   |  24  |  0.77    Ca    8.3<L>      09 Mar 2024 05:50  Phos  3.3     03-09  Mg     2.10     03-09                Inpatient Medications:  MEDICATIONS  (STANDING):  apixaban 5 milliGRAM(s) Oral every 12 hours  atorvastatin 20 milliGRAM(s) Oral at bedtime  buDESOnide   90 MICROgram(s) Inhaler 2 Puff(s) Inhalation two times a day  captopril 25 milliGRAM(s) Oral <User Schedule>  chlorhexidine 2% Cloths 1 Application(s) Topical daily  latanoprost 0.005% Ophthalmic Solution 1 Drop(s) Both EYES at bedtime  piperacillin/tazobactam IVPB.. 3.375 Gram(s) IV Intermittent every 8 hours  polyethylene glycol 3350 17 Gram(s) Oral two times a day  propranolol 80 milliGRAM(s) Oral daily  senna 1 Tablet(s) Oral daily  tamsulosin 0.4 milliGRAM(s) Oral at bedtime      Assessment:  79y Male w pmhx of Afib on Eliquis s/p recent LIHR  and 2 recent falls w extensive bruising and associated groin and scrotal hematoma. Incidentally, labs found pt to be hyponatremic    Recs:  cardiac stable  care per surgery   vol status stable. fluid restriction per renal  AC resume for persistent afib, watchman eval as OP  tele monitoring  hyponatremia, tx per renal  transfuse prn to hg > 7  dvt ppx/eliquis          Over 25 minutes spent on total encounter; more than 50% of the visit was spent counseling and/or coordinating care by the attending physician.      Steve Gu MD   Cardiovascular Disease  [phone]

## 2024-03-10 ENCOUNTER — TRANSCRIPTION ENCOUNTER (OUTPATIENT)
Age: 79
End: 2024-03-10

## 2024-03-10 VITALS
DIASTOLIC BLOOD PRESSURE: 58 MMHG | OXYGEN SATURATION: 100 % | TEMPERATURE: 98 F | SYSTOLIC BLOOD PRESSURE: 111 MMHG | HEART RATE: 62 BPM | RESPIRATION RATE: 18 BRPM

## 2024-03-10 LAB
ANION GAP SERPL CALC-SCNC: 9 MMOL/L — SIGNIFICANT CHANGE UP (ref 7–14)
BUN SERPL-MCNC: 12 MG/DL — SIGNIFICANT CHANGE UP (ref 7–23)
CALCIUM SERPL-MCNC: 8.2 MG/DL — LOW (ref 8.4–10.5)
CHLORIDE SERPL-SCNC: 98 MMOL/L — SIGNIFICANT CHANGE UP (ref 98–107)
CO2 SERPL-SCNC: 24 MMOL/L — SIGNIFICANT CHANGE UP (ref 22–31)
CREAT SERPL-MCNC: 0.75 MG/DL — SIGNIFICANT CHANGE UP (ref 0.5–1.3)
EGFR: 92 ML/MIN/1.73M2 — SIGNIFICANT CHANGE UP
GLUCOSE SERPL-MCNC: 93 MG/DL — SIGNIFICANT CHANGE UP (ref 70–99)
HCT VFR BLD CALC: 27.7 % — LOW (ref 39–50)
HGB BLD-MCNC: 9.4 G/DL — LOW (ref 13–17)
MAGNESIUM SERPL-MCNC: 2.1 MG/DL — SIGNIFICANT CHANGE UP (ref 1.6–2.6)
MCHC RBC-ENTMCNC: 31 PG — SIGNIFICANT CHANGE UP (ref 27–34)
MCHC RBC-ENTMCNC: 33.9 GM/DL — SIGNIFICANT CHANGE UP (ref 32–36)
MCV RBC AUTO: 91.4 FL — SIGNIFICANT CHANGE UP (ref 80–100)
NRBC # BLD: 0 /100 WBCS — SIGNIFICANT CHANGE UP (ref 0–0)
NRBC # FLD: 0 K/UL — SIGNIFICANT CHANGE UP (ref 0–0)
PHOSPHATE SERPL-MCNC: 3 MG/DL — SIGNIFICANT CHANGE UP (ref 2.5–4.5)
PLATELET # BLD AUTO: 185 K/UL — SIGNIFICANT CHANGE UP (ref 150–400)
POTASSIUM SERPL-MCNC: 4.2 MMOL/L — SIGNIFICANT CHANGE UP (ref 3.5–5.3)
POTASSIUM SERPL-SCNC: 4.2 MMOL/L — SIGNIFICANT CHANGE UP (ref 3.5–5.3)
RBC # BLD: 3.03 M/UL — LOW (ref 4.2–5.8)
RBC # FLD: 15.4 % — HIGH (ref 10.3–14.5)
SODIUM SERPL-SCNC: 131 MMOL/L — LOW (ref 135–145)
WBC # BLD: 7.75 K/UL — SIGNIFICANT CHANGE UP (ref 3.8–10.5)
WBC # FLD AUTO: 7.75 K/UL — SIGNIFICANT CHANGE UP (ref 3.8–10.5)

## 2024-03-10 RX ADMIN — POLYETHYLENE GLYCOL 3350 17 GRAM(S): 17 POWDER, FOR SOLUTION ORAL at 06:57

## 2024-03-10 RX ADMIN — ERGOCALCIFEROL 50000 UNIT(S): 1.25 CAPSULE ORAL at 06:57

## 2024-03-10 RX ADMIN — PIPERACILLIN AND TAZOBACTAM 25 GRAM(S): 4; .5 INJECTION, POWDER, LYOPHILIZED, FOR SOLUTION INTRAVENOUS at 06:57

## 2024-03-10 RX ADMIN — CAPTOPRIL 25 MILLIGRAM(S): 12.5 TABLET ORAL at 06:57

## 2024-03-10 RX ADMIN — APIXABAN 5 MILLIGRAM(S): 2.5 TABLET, FILM COATED ORAL at 06:57

## 2024-03-10 NOTE — DISCHARGE NOTE NURSING/CASE MANAGEMENT/SOCIAL WORK - NSDCPEFALRISK_GEN_ALL_CORE
For information on Fall & Injury Prevention, visit: [email or web address] OR  [email or web address] OR  https://www.cdc.gov/steadi/patient.html

## 2024-03-10 NOTE — PROGRESS NOTE ADULT - ASSESSMENT
79M w/ PMH of Afib on Eliquis, s/p recent left inguinal hernia repair 7 days ago. 2 recent falls without headstrike, one on tuesday, one earlier today, was sent to ER by surgeon following seeing extensive bruising from waist to bilateral thighs.  Passing gas, denies groin pain, does have some swelling right groin, normal bowel function with soft brown BM today. No HS with fall, fell onto buttocks both times.  Pt reports mild lightheadedness and weakness of legs.  Nephrology consulted for hyponatremia.    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   Pt asymptomatic.  B/L LE edema likely sec to PVD.  Pt reports he has been drinking more than 8 glasses of water daily for years.  Recent decreased solid intake d/t hernia repair.  His Urine /Plasma Electrolyte ratio was 1.000. Hence he may need help other than fluid restriction for correction.  s/p tolvaptan 15 mg 3/6.  Na worsening . consider salt tab 1gm BID x 2 days  continue  fluid restriction to 1L/day.  Monitor Na.    HTN:  Fluctuating.  c/w BP meds  Low salt diet.  Monitor BP.    Anemia:  Workup per primary team.  Transfuse for Hgb <8.  Monitor Hgb.    Hypocalcemia:  Marginal but stable.  Albumin level is low.  Optimize albumin.  Vit D 25OH low @ 8.7.  ergocalciferol [id number] weekly.  Monitor Ca.    Hypophosphatemia:  Likely sec to poor intake of solids.  Replete as needed.  Monitor    Proteinuria:  Trace on UA.  UPr/Cr 0.2gm.  Monitor.

## 2024-03-10 NOTE — PROGRESS NOTE ADULT - NUTRITIONAL ASSESSMENT
This patient has been assessed with a concern for Malnutrition and has been determined to have a diagnosis/diagnoses of Moderate protein-calorie malnutrition.    This patient is being managed with:   Diet Regular-  1000mL Fluid Restriction ([id number])  Entered: Mar  8 2024  5:54AM  
This patient has been assessed with a concern for Malnutrition and has been determined to have a diagnosis/diagnoses of Moderate protein-calorie malnutrition.    This patient is being managed with:   Diet Regular-  1000mL Fluid Restriction ([id number])  Entered: Mar  8 2024  5:54AM  
This patient has been assessed with a concern for Malnutrition and has been determined to have a diagnosis/diagnoses of Moderate protein-calorie malnutrition.    This patient is being managed with:   Diet Regular-  Entered: Mar  1 2024 11:55AM  

## 2024-03-10 NOTE — PROGRESS NOTE ADULT - SUBJECTIVE AND OBJECTIVE BOX
[facility] [place] [facility]   MD DON WHITE MD RUORU WONG, PA    TEL:  FROM 9 AM to 5 PM ---OFFICE: [phone]  AVAILABLE ON TEAMS    FROM 5 PM - 9 AM PLEASE CALL ANSWERING SERVICE: [phone]    RENAL FOLLOW UP NOTE--Date of Service 03-10-24 @ 08:58  --------------------------------------------------------------------------------  HPI:      Pt seen and examined at bedside.        PAST HISTORY  --------------------------------------------------------------------------------  No significant changes to PMH, PSH, FHx, SHx, unless otherwise noted    ALLERGIES & MEDICATIONS  --------------------------------------------------------------------------------  Allergies    tetracycline (Hives; Rash)    Intolerances      Standing Inpatient Medications  apixaban 5 milliGRAM(s) Oral every 12 hours  atorvastatin 20 milliGRAM(s) Oral at bedtime  buDESOnide   90 MICROgram(s) Inhaler 2 Puff(s) Inhalation two times a day  captopril 25 milliGRAM(s) Oral <User Schedule>  chlorhexidine 2% Cloths 1 Application(s) Topical daily  ergocalciferol 16224 Unit(s) Oral <User Schedule>  latanoprost 0.005% Ophthalmic Solution 1 Drop(s) Both EYES at bedtime  piperacillin/tazobactam IVPB.. 3.375 Gram(s) IV Intermittent every 8 hours  polyethylene glycol 3350 17 Gram(s) Oral two times a day  propranolol 80 milliGRAM(s) Oral daily  senna 1 Tablet(s) Oral daily  tamsulosin 0.4 milliGRAM(s) Oral at bedtime    PRN Inpatient Medications  bisacodyl Suppository 10 milliGRAM(s) Rectal daily PRN      REVIEW OF SYSTEMS  --------------------------------------------------------------------------------  General: no fever  MSK: trace  edema     VITALS/PHYSICAL EXAM  --------------------------------------------------------------------------------  T(C): 36.6 (03-10-24 @ 06:50), Max: 36.7 (03-10-24 @ 01:42)  HR: 93 (03-10-24 @ 06:50) (71 - 93)  BP: 121/70 (03-10-24 @ 06:50) (119/70 - 156/69)  RR: 16 (03-10-24 @ 06:50) (16 - 19)  SpO2: 100% (03-10-24 @ 06:50) (98% - 100%)  Wt(kg): --        03-09-24 @ 06:01  -  03-10-24 @ 07:00  --------------------------------------------------------  IN: 1080 mL / OUT: 2000 mL / NET: -920 mL      Physical Exam:  	General: NAD  Neck: No JVD  Respiratory: CTAB, no wheezes, rales or rhonchi  Cardiovascular: S1, S2, RRR  Gastrointestinal: BS+, soft, NT/ND  Extremities: Trace b/l LE edema  LABS/STUDIES  --------------------------------------------------------------------------------              9.4    7.75  >-----------<  185      [03-10-24 @ 06:35]              27.7     131  |  98  |  12  ----------------------------<  93      [03-10-24 @ 06:35]  4.2   |  24  |  0.75        Ca     8.2     [03-10-24 @ 06:35]      Mg     2.10     [03-10-24 @ 06:35]      Phos  3.0     [03-10-24 @ 06:35]            Creatinine Trend:  SCr 0.75 [03-10 @ 06:35]  SCr 0.77 [03-09 @ 05:50]  SCr 0.83 [03-08 @ 06:33]  SCr 0.89 [03-07 @ 09:30]  SCr 0.91 [03-06 @ 22:00]    Urinalysis - [03-10-24 @ 06:35]      Color  / Appearance  / SG  / pH       Gluc 93 / Ketone   / Bili  / Urobili        Blood  / Protein  / Leuk Est  / Nitrite       RBC  / WBC  / Hyaline  / Gran  / Sq Epi  / Non Sq Epi  / Bacteria       Vitamin D (25OH) 8.7      [03-09-24 @ 05:50]  TSH 2.38      [03-04-24 @ 02:10]

## 2024-03-10 NOTE — PROGRESS NOTE ADULT - ATTENDING COMMENTS
med induced coagulopathy, acute blood loss anemia and resorptive jaundice  -hold eliquis as per d/w surgeon  -s/p prbc tx per surgeon  -has dilshad now per surgeon  -suggest subc heparin for VTE prophylaxis while off eliquis if primary surgical team agrees    severe hyponatremia, monitor for neurologic consequences during judicious repletion  -hydration with hypertonic saline, may decrease to 30mL/hr now given addition of salt tabs by mouth  -serial sodiums, goal today is around 120    patient is stable for transfer to regular burns when bed becomes available for continued coordinated care by primary surgical service
DATE OF SERVICE: 03-07-24 @ 17:40    Downgraded to floor  ecchymosis improving  Na 129  FU nephrology recs
Mr. Cotter continues to recover well   -monitor crit for resolution of postoperative anemia  -con't zosyn per primary team  Hyponatremia stable - increasing salt tabs  -d/c to floor care    Kiran Shabazz MD  Acute and Critical Care Surgery    The Acute Care Surgery (B Team) Attending Group Practice:  Dr. Jaun Ramos, Dr. Zachariah Alvarez, Dr. Kiran Shabazz,  Dr. Sami Bob and Dr. Denita Ayala     Urgent issues - spectra [phone] or [phone]  Nonurgent issues - [phone]  Patient appointments or after hours - [phone]
Pt seen/examined, improving scrotal/perineal hematoma/echymosis.    - cont replace Na  - cont IV antibx  - keep Nava due to hematoma leading to retention.  - PT eval
med induced coagulopathy, acute blood loss anemia and resorptive jaundice  -hold eliquis as per d/w surgeon    severe hyponatremia, monitor for neurologic consequences during judicious repletion  -hydration with hypertonic saline  -serial sodiums
Pt seen/examined, agree with above. Improving scrotal hematoma/echymosis.    - cont management low Na/SIADH as per Nephrol and SICU  - cont IV antibx  - cont Nava
Pt seen/examined. No acute events. No clinical evidence of ongoing bleeding. Improving lower abd wall echymosis. Stable scrotal hematoma. Lt groin incision looks good.    - Monitor for urinary retention due to extent of scrotal/perineal/supra pubic hematoma  - agree with gentle laxative  - cont holding Eliquis  - cont treat hyponatremia as per Nephrol/SICU
Mr. Cotter is recovering well from a postoperative hematoma as a cause of acute blood loss anemia.   -con't to await resolution of abscess. IF infection a potential issue, antibiotics can be prescribed but we would advocate for hematoma drainage. Primary team aware  -crit stable, slight drops related to nutrition, volume status  Hyponatremia improving, stable for now  -will increase salt load if not correcting further  -strict free water restriction not needed right now     Floor care pending transfer      Kiran Shabazz MD  Acute and Critical Care Surgery    The Acute Care Surgery (B Team) Attending Group Practice:  Dr. Jaun Ramos, Dr. Zachariah Alvarez, Dr. Kiran Shabazz,  Dr. Sami Bob and Dr. Denita Ayala     Urgent issues - spectra [phone] or [phone]  Nonurgent issues - [phone]  Patient appointments or after hours - [phone]
Na 133, voiding appropriately after Nava removal  Hb stable  Ecchymosis improving  Dc to rehab today off antibiotics
Patient refusing rehab. Family wants to take him home as well  Dc to home today
Pt recovering well from inguinal hematoma and hyponatremia  Enteral nutrition to supplement electrolyte loss  Will follow nephro recs and d/c sodium repletions   List for floor care    Kiran Shabazz MD  Acute and Critical Care Surgery    The Acute Care Surgery (B Team) Attending Group Practice:  Dr. Jaun Ramos, Dr. Zachariah Alvarez, Dr. Kiran Shabazz,  Dr. Sami Bob and Dr. Denita Ayala     Urgent issues - spectra [phone] or [phone]  Nonurgent issues - [phone]  Patient appointments or after hours - [phone]

## 2024-03-10 NOTE — PROGRESS NOTE ADULT - ASSESSMENT
79 year old man with afib on eliquis and recent [facility] 2/22/24 by Dr. Bain, now with falls x2 after resuming Eliquis Hyponatremia to 106 on BMP at admission, patient admitted to SICU for management. Patient transferred to the floor 3/7. Nava was removed and Eliquis was restarted on 3/8.       Plan:  - Eliquis   - Diet: regular, 1 L fluid restrictiont  - Miralax  - Appreciate nephro recs  - Monitor BMP  - on zosyn   - Dispo- DC home today per patient refusal of rehab    A Team Surgery   [id number]   79 year old man with afib on eliquis and recent [facility] 2/22/24 by Dr. Bain, now with falls x2 after resuming Eliquis Hyponatremia to 106 on BMP at admission, patient admitted to SICU for management. Patient transferred to the floor 3/7. Nava was removed and Eliquis was restarted on 3/8.       Plan:  - Eliquis   - Diet: regular, 1 L fluid restriction  - Miralax  - Appreciate nephro recs  - Monitor BMP  - on zosyn   - Dispo- DC home today, will go to rehab from home    A Team Surgery   [id number]

## 2024-03-10 NOTE — PROGRESS NOTE ADULT - PROVIDER SPECIALTY LIST ADULT
Cardiology
Colorectal Surgery
Nephrology
SICU
Surgery
Cardiology
Nephrology
Nephrology
SICU
Surgery
Colorectal Surgery
Nephrology
Nephrology
SICU
Surgery
SICU
Nephrology
Nephrology

## 2024-03-10 NOTE — PROGRESS NOTE ADULT - SUBJECTIVE AND OBJECTIVE BOX
A Team Surgery Daily Progress Note    Subjective:   Patient seen at bedside this AM, resting comfortably in bed. Reports feeling much improved, tolerating diet, voiding, ambulating. Eager to go home. No N/V, CP/SOB reported.     Objective:  Vital Signs  T(C): 36.7 (03-10 @ 01:42), Max: 36.7 (03-10 @ 01:42)  HR: 82 (03-10 @ 01:42) (71 - 89)  BP: 126/61 (03-10 @ 01:42) (119/70 - 156/69)  RR: 17 (03-10 @ 01:42) (17 - 19)  SpO2: 100% (03-10 @ 01:42) (98% - 100%)  03-09-24 @ 06:01  -  03-10-24 @ 07:00  --------------------------------------------------------  IN:  Total IN: 0 mL    OUT:    Voided (mL): 1600 mL  Total OUT: 1600 mL    Total NET: -1600 mL          Physical Exam:  GEN: resting in bed comfortably in NAD  RESP: no increased WOB  ABD: soft, nontender, nondistended. bruising improving, inguinal hernia incision c/d/i  EXTR: warm, well-perfused without gross deformities; spontaneous movement in b/l U/L extrem  NEURO: AAOx4    Labs:                        9.5    7.81  )-----------( 207      ( 09 Mar 2024 05:50 )             28.2   03-09    133<L>  |  99  |  13  ----------------------------<  99  4.5   |  24  |  0.77    Ca    8.3<L>      09 Mar 2024 05:50  Phos  3.3     03-09  Mg     2.10     03-09      CAPILLARY BLOOD GLUCOSE          Medications:   MEDICATIONS  (STANDING):  apixaban 5 milliGRAM(s) Oral every 12 hours  atorvastatin 20 milliGRAM(s) Oral at bedtime  buDESOnide   90 MICROgram(s) Inhaler 2 Puff(s) Inhalation two times a day  captopril 25 milliGRAM(s) Oral <User Schedule>  chlorhexidine 2% Cloths 1 Application(s) Topical daily  ergocalciferol 14469 Unit(s) Oral <User Schedule>  latanoprost 0.005% Ophthalmic Solution 1 Drop(s) Both EYES at bedtime  piperacillin/tazobactam IVPB.. 3.375 Gram(s) IV Intermittent every 8 hours  polyethylene glycol 3350 17 Gram(s) Oral two times a day  propranolol 80 milliGRAM(s) Oral daily  senna 1 Tablet(s) Oral daily  tamsulosin 0.4 milliGRAM(s) Oral at bedtime    MEDICATIONS  (PRN):  bisacodyl Suppository 10 milliGRAM(s) Rectal daily PRN Constipation      Imaging:       A Team Surgery Daily Progress Note    Subjective:   Patient seen at bedside this AM, resting comfortably in bed. Reports feeling much improved, tolerating diet, voiding, ambulating to bathroom. Eager to go home. No N/V, No CP/SOB reported.     Objective:  Vital Signs  T(C): 36.7 (03-10 @ 01:42), Max: 36.7 (03-10 @ 01:42)  HR: 82 (03-10 @ 01:42) (71 - 89)  BP: 126/61 (03-10 @ 01:42) (119/70 - 156/69)  RR: 17 (03-10 @ 01:42) (17 - 19)  SpO2: 100% (03-10 @ 01:42) (98% - 100%)  03-09-24 @ 06:01  -  03-10-24 @ 07:00  --------------------------------------------------------  IN:  Total IN: 0 mL    OUT:    Voided (mL): 1600 mL  Total OUT: 1600 mL    Total NET: -1600 mL          Physical Exam:  GEN: resting in bed comfortably in NAD  RESP: no increased WOB  ABD: soft, nontender, nondistended. bruising improving, inguinal hernia incision c/d/i  EXTR: warm, well-perfused without gross deformities; spontaneous movement in b/l U/L extrem  NEURO: AAOx4    Labs:                        9.5    7.81  )-----------( 207      ( 09 Mar 2024 05:50 )             28.2   03-09    133<L>  |  99  |  13  ----------------------------<  99  4.5   |  24  |  0.77    Ca    8.3<L>      09 Mar 2024 05:50  Phos  3.3     03-09  Mg     2.10     03-09      CAPILLARY BLOOD GLUCOSE          Medications:   MEDICATIONS  (STANDING):  apixaban 5 milliGRAM(s) Oral every 12 hours  atorvastatin 20 milliGRAM(s) Oral at bedtime  buDESOnide   90 MICROgram(s) Inhaler 2 Puff(s) Inhalation two times a day  captopril 25 milliGRAM(s) Oral <User Schedule>  chlorhexidine 2% Cloths 1 Application(s) Topical daily  ergocalciferol 17085 Unit(s) Oral <User Schedule>  latanoprost 0.005% Ophthalmic Solution 1 Drop(s) Both EYES at bedtime  piperacillin/tazobactam IVPB.. 3.375 Gram(s) IV Intermittent every 8 hours  polyethylene glycol 3350 17 Gram(s) Oral two times a day  propranolol 80 milliGRAM(s) Oral daily  senna 1 Tablet(s) Oral daily  tamsulosin 0.4 milliGRAM(s) Oral at bedtime    MEDICATIONS  (PRN):  bisacodyl Suppository 10 milliGRAM(s) Rectal daily PRN Constipation      Imaging:

## 2024-03-10 NOTE — DISCHARGE NOTE NURSING/CASE MANAGEMENT/SOCIAL WORK - PATIENT PORTAL LINK FT
You can access the FollowMyHealth Patient Portal offered by [facility] by registering at the following website: [email or web address]. By joining [facility]’s FollowMyHealth portal, you will also be able to view your health information using other applications (apps) compatible with our system.

## 2024-06-20 ENCOUNTER — APPOINTMENT (OUTPATIENT)
Dept: ORTHOPEDIC SURGERY | Facility: CLINIC | Age: 79
End: 2024-06-20
Payer: MEDICARE

## 2024-06-20 VITALS — HEIGHT: 75 IN | BODY MASS INDEX: 27.98 KG/M2 | WEIGHT: 225 LBS

## 2024-06-20 DIAGNOSIS — M54.2 CERVICALGIA: ICD-10-CM

## 2024-06-20 DIAGNOSIS — M50.320 OTHER CERVICAL DISC DEGENERATION, MID-CERVICAL REGION, UNSPECIFIED LEVEL: ICD-10-CM

## 2024-06-20 PROCEDURE — 72040 X-RAY EXAM NECK SPINE 2-3 VW: CPT

## 2024-06-20 PROCEDURE — 99213 OFFICE O/P EST LOW 20 MIN: CPT

## 2024-06-20 RX ORDER — CAPTOPRIL 25 MG/1
25 TABLET ORAL
Refills: 0 | Status: ACTIVE | COMMUNITY

## 2024-06-20 RX ORDER — ATORVASTATIN CALCIUM 20 MG/1
20 TABLET, FILM COATED ORAL
Refills: 0 | Status: ACTIVE | COMMUNITY

## 2024-06-20 RX ORDER — METHYLPREDNISOLONE 4 MG/1
4 TABLET ORAL
Qty: 1 | Refills: 0 | Status: ACTIVE | COMMUNITY
Start: 2024-06-20 | End: 1900-01-01

## 2024-06-20 RX ORDER — TAMSULOSIN HYDROCHLORIDE 0.4 MG/1
0.4 CAPSULE ORAL
Refills: 0 | Status: ACTIVE | COMMUNITY

## 2024-06-20 RX ORDER — APIXABAN 5 MG/1
5 TABLET, FILM COATED ORAL
Refills: 0 | Status: ACTIVE | COMMUNITY

## 2024-06-20 RX ORDER — LATANOPROST/PF 0.005 %
0.01 DROPS OPHTHALMIC (EYE)
Refills: 0 | Status: ACTIVE | COMMUNITY

## 2024-06-20 RX ORDER — BUDESONIDE 180 UG/1
180 AEROSOL, POWDER RESPIRATORY (INHALATION)
Refills: 0 | Status: ACTIVE | COMMUNITY

## 2024-06-20 RX ORDER — TIZANIDINE 4 MG/1
4 TABLET ORAL
Qty: 60 | Refills: 0 | Status: ACTIVE | COMMUNITY
Start: 2024-06-20 | End: 1900-01-01

## 2024-06-20 RX ORDER — PROPRANOLOL HYDROCHLORIDE 80 MG/1
80 TABLET ORAL
Refills: 0 | Status: ACTIVE | COMMUNITY

## 2024-06-20 NOTE — HISTORY OF PRESENT ILLNESS
[Neck] : neck [Sudden] : sudden [Radiating] : radiating [Sharp] : sharp [Stabbing] : stabbing [Frequent] : frequent [Standing] : standing [Lying in bed] : lying in bed [de-identified] : 6/20/2024: pt was fishing and tweaked neck on windy boat, pain radiating into head and rt shoulder. No pain down arm, no numbness [] : no [FreeTextEntry1] : rt shoulder [FreeTextEntry3] : 6/13/2024 [FreeTextEntry5] : pt was fishing and hurt neck, pain spreading to head and rt shoulder [FreeTextEntry7] : head, r shoulder [de-identified] : turning head up/down, laterally

## 2024-06-20 NOTE — PHYSICAL EXAM
[Flexion] : flexion [Extension] : extension [Rotation to left] : rotation to left [Rotation to right] : rotation to right [] : normal deep tendon reflexes bilateral upper extremities

## 2024-07-11 ENCOUNTER — APPOINTMENT (OUTPATIENT)
Dept: ORTHOPEDIC SURGERY | Facility: CLINIC | Age: 79
End: 2024-07-11
Payer: MEDICARE

## 2024-07-11 VITALS — WEIGHT: 225 LBS | BODY MASS INDEX: 27.98 KG/M2 | HEIGHT: 75 IN

## 2024-07-11 DIAGNOSIS — M54.2 CERVICALGIA: ICD-10-CM

## 2024-07-11 DIAGNOSIS — M50.320 OTHER CERVICAL DISC DEGENERATION, MID-CERVICAL REGION, UNSPECIFIED LEVEL: ICD-10-CM

## 2024-07-11 PROCEDURE — 99213 OFFICE O/P EST LOW 20 MIN: CPT

## 2024-08-29 ENCOUNTER — APPOINTMENT (OUTPATIENT)
Dept: ORTHOPEDIC SURGERY | Facility: CLINIC | Age: 79
End: 2024-08-29
Payer: MEDICARE

## 2024-08-29 DIAGNOSIS — M54.2 CERVICALGIA: ICD-10-CM

## 2024-08-29 DIAGNOSIS — M50.320 OTHER CERVICAL DISC DEGENERATION, MID-CERVICAL REGION, UNSPECIFIED LEVEL: ICD-10-CM

## 2024-08-29 PROCEDURE — 99213 OFFICE O/P EST LOW 20 MIN: CPT

## 2024-08-29 NOTE — HISTORY OF PRESENT ILLNESS
[Neck] : neck [Sudden] : sudden [Radiating] : radiating [Sharp] : sharp [Stabbing] : stabbing [Frequent] : frequent [Standing] : standing [Lying in bed] : lying in bed [de-identified] : 7/11/24: Here for f/up neck. Reports good improvement with PT. Continues to have some pain with certain movements. Denies any n/t.  6/20/2024: pt was fishing and tweaked neck on windy boat, pain radiating into head and rt shoulder. No pain down arm, no numbness [] : no [FreeTextEntry1] : rt shoulder [FreeTextEntry3] : 6/13/2024 [FreeTextEntry7] : head, r shoulder [de-identified] : turning head up/down, laterally

## 2024-08-29 NOTE — PHYSICAL EXAM
[Rotation to right] : rotation to right [] : normal deep tendon reflexes bilateral upper extremities [FreeTextEntry8] : improved

## 2024-08-29 NOTE — REASON FOR VISIT
[FreeTextEntry2] : 08/29/2024 finished PT for neck, approximately 85% improved, still gets soreness at times

## 2024-10-01 ENCOUNTER — APPOINTMENT (OUTPATIENT)
Dept: RADIOLOGY | Facility: CLINIC | Age: 79
End: 2024-10-01
Payer: MEDICARE

## 2024-10-01 PROCEDURE — 71046 X-RAY EXAM CHEST 2 VIEWS: CPT

## 2024-11-15 ENCOUNTER — APPOINTMENT (OUTPATIENT)
Dept: ORTHOPEDIC SURGERY | Facility: CLINIC | Age: 79
End: 2024-11-15
Payer: MEDICARE

## 2024-11-15 DIAGNOSIS — M70.62 TROCHANTERIC BURSITIS, LEFT HIP: ICD-10-CM

## 2024-11-15 PROCEDURE — 72100 X-RAY EXAM L-S SPINE 2/3 VWS: CPT

## 2024-11-15 PROCEDURE — 99213 OFFICE O/P EST LOW 20 MIN: CPT | Mod: 25

## 2024-11-15 PROCEDURE — 20610 DRAIN/INJ JOINT/BURSA W/O US: CPT | Mod: LT

## 2024-11-15 PROCEDURE — 72170 X-RAY EXAM OF PELVIS: CPT

## 2025-04-01 ENCOUNTER — APPOINTMENT (OUTPATIENT)
Dept: CT IMAGING | Facility: CLINIC | Age: 80
End: 2025-04-01
Payer: MEDICARE

## 2025-04-01 PROCEDURE — 71250 CT THORAX DX C-: CPT

## 2025-04-01 PROCEDURE — 74177 CT ABD & PELVIS W/CONTRAST: CPT

## 2025-04-07 ENCOUNTER — RX RENEWAL (OUTPATIENT)
Age: 80
End: 2025-04-07

## 2025-04-08 ENCOUNTER — APPOINTMENT (OUTPATIENT)
Dept: SURGICAL ONCOLOGY | Facility: CLINIC | Age: 80
End: 2025-04-08

## 2025-06-05 ENCOUNTER — APPOINTMENT (OUTPATIENT)
Dept: ORTHOPEDIC SURGERY | Facility: CLINIC | Age: 80
End: 2025-06-05
Payer: MEDICARE

## 2025-06-05 VITALS — WEIGHT: 225 LBS | HEIGHT: 75 IN | BODY MASS INDEX: 27.98 KG/M2

## 2025-06-05 PROCEDURE — 73140 X-RAY EXAM OF FINGER(S): CPT | Mod: LT

## 2025-06-05 PROCEDURE — 99213 OFFICE O/P EST LOW 20 MIN: CPT | Mod: 25

## 2025-06-05 PROCEDURE — 29280 STRAPPING OF HAND OR FINGER: CPT | Mod: LT

## 2025-06-09 ENCOUNTER — APPOINTMENT (OUTPATIENT)
Dept: ORTHOPEDIC SURGERY | Facility: CLINIC | Age: 80
End: 2025-06-09
Payer: MEDICARE

## 2025-06-09 VITALS — BODY MASS INDEX: 25.49 KG/M2 | HEIGHT: 75 IN | WEIGHT: 205 LBS

## 2025-06-09 PROBLEM — Z78.9 NO PERTINENT FAMILY HISTORY: Status: ACTIVE | Noted: 2025-06-09

## 2025-06-09 PROBLEM — M10.9 GOUT SYNOVITIS: Status: ACTIVE | Noted: 2025-06-05

## 2025-06-09 PROBLEM — Z78.9 NO PERTINENT PAST MEDICAL HISTORY: Status: RESOLVED | Noted: 2025-06-09 | Resolved: 2025-06-09

## 2025-06-09 PROCEDURE — 99213 OFFICE O/P EST LOW 20 MIN: CPT

## 2025-06-17 ENCOUNTER — APPOINTMENT (OUTPATIENT)
Dept: PULMONOLOGY | Facility: CLINIC | Age: 80
End: 2025-06-17

## 2025-08-28 ENCOUNTER — APPOINTMENT (OUTPATIENT)
Dept: CT IMAGING | Facility: CLINIC | Age: 80
End: 2025-08-28

## 2025-08-28 PROCEDURE — 71250 CT THORAX DX C-: CPT | Mod: TC

## (undated) DEVICE — SOL IRR POUR NS 0.9% 500ML

## (undated) DEVICE — SUT VICRYL 2-0 18" TIES UNDYED

## (undated) DEVICE — PACK MINOR WITH LAP

## (undated) DEVICE — ELCTR GROUNDING PAD ADULT COVIDIEN

## (undated) DEVICE — POSITIONER STRAP ARMBOARD VELCRO TS-30

## (undated) DEVICE — SUT PROLENE 1 30" CT-1

## (undated) DEVICE — SYR LUER LOK 20CC

## (undated) DEVICE — SUT VICRYL 2-0 27" SH UNDYED

## (undated) DEVICE — GOWN LG

## (undated) DEVICE — DRAPE LAPAROTOMY TRANSVERSE

## (undated) DEVICE — SPONGE DISSECTOR PEANUT

## (undated) DEVICE — SUT VICRYL 0 18" TIES UNDYED

## (undated) DEVICE — DRAPE 3/4 SHEET 52X76"

## (undated) DEVICE — DRAIN PENROSE .25" X 12" SILICONE

## (undated) DEVICE — ELCTR STRYKER NEPTUNE SMOKE EVACUATION PENCIL (GREEN)

## (undated) DEVICE — BASIN SET DOUBLE

## (undated) DEVICE — DRAPE TOWEL BLUE 17" X 24"

## (undated) DEVICE — SOL IRR POUR H2O 500ML

## (undated) DEVICE — SUT PROLENE 0 30" CT-2

## (undated) DEVICE — SUT MONOCRYL 4-0 27" PS-2 UNDYED

## (undated) DEVICE — DRSG STERISTRIPS 0.5 X 4"

## (undated) DEVICE — VENODYNE/SCD SLEEVE CALF MEDIUM

## (undated) DEVICE — SUT VICRYL 3-0 18" SH (POP-OFF)

## (undated) DEVICE — WARMING BLANKET FULL ADULT